# Patient Record
Sex: MALE | Race: WHITE | NOT HISPANIC OR LATINO | Employment: FULL TIME | URBAN - METROPOLITAN AREA
[De-identification: names, ages, dates, MRNs, and addresses within clinical notes are randomized per-mention and may not be internally consistent; named-entity substitution may affect disease eponyms.]

---

## 2018-08-09 ENCOUNTER — OFFICE VISIT (OUTPATIENT)
Dept: FAMILY MEDICINE CLINIC | Facility: CLINIC | Age: 43
End: 2018-08-09

## 2018-08-09 VITALS
TEMPERATURE: 98.4 F | OXYGEN SATURATION: 98 % | DIASTOLIC BLOOD PRESSURE: 76 MMHG | HEIGHT: 69 IN | BODY MASS INDEX: 36.73 KG/M2 | WEIGHT: 248 LBS | SYSTOLIC BLOOD PRESSURE: 118 MMHG | HEART RATE: 77 BPM

## 2018-08-09 DIAGNOSIS — Z13.6 SCREENING FOR ISCHEMIC HEART DISEASE: ICD-10-CM

## 2018-08-09 DIAGNOSIS — K21.9 GASTROESOPHAGEAL REFLUX DISEASE WITHOUT ESOPHAGITIS: ICD-10-CM

## 2018-08-09 DIAGNOSIS — J45.20 MILD INTERMITTENT ASTHMA WITHOUT COMPLICATION: ICD-10-CM

## 2018-08-09 DIAGNOSIS — Z13.1 SCREENING FOR DIABETES MELLITUS: ICD-10-CM

## 2018-08-09 DIAGNOSIS — Z12.5 SPECIAL SCREENING FOR MALIGNANT NEOPLASM OF PROSTATE: ICD-10-CM

## 2018-08-09 DIAGNOSIS — R06.83 SNORING: ICD-10-CM

## 2018-08-09 DIAGNOSIS — Z00.00 ROUTINE GENERAL MEDICAL EXAMINATION AT A HEALTH CARE FACILITY: Primary | ICD-10-CM

## 2018-08-09 DIAGNOSIS — M54.50 CHRONIC MIDLINE LOW BACK PAIN WITHOUT SCIATICA: ICD-10-CM

## 2018-08-09 DIAGNOSIS — R53.83 FATIGUE, UNSPECIFIED TYPE: ICD-10-CM

## 2018-08-09 DIAGNOSIS — G89.29 CHRONIC MIDLINE LOW BACK PAIN WITHOUT SCIATICA: ICD-10-CM

## 2018-08-09 LAB
ALBUMIN SERPL-MCNC: 5 G/DL (ref 3.5–5.2)
ALBUMIN/GLOB SERPL: 1.9 G/DL
ALP SERPL-CCNC: 81 U/L (ref 39–117)
ALT SERPL-CCNC: 32 U/L (ref 1–41)
AST SERPL-CCNC: 12 U/L (ref 1–40)
BILIRUB SERPL-MCNC: 0.3 MG/DL (ref 0.1–1.2)
BUN SERPL-MCNC: 11 MG/DL (ref 6–20)
BUN/CREAT SERPL: 12.6 (ref 7–25)
CALCIUM SERPL-MCNC: 10 MG/DL (ref 8.6–10.5)
CHLORIDE SERPL-SCNC: 100 MMOL/L (ref 98–107)
CHOLEST SERPL-MCNC: 158 MG/DL (ref 0–200)
CO2 SERPL-SCNC: 27.8 MMOL/L (ref 22–29)
CREAT SERPL-MCNC: 0.87 MG/DL (ref 0.76–1.27)
GLOBULIN SER CALC-MCNC: 2.6 GM/DL
GLUCOSE SERPL-MCNC: 115 MG/DL (ref 65–99)
HDLC SERPL-MCNC: 45 MG/DL (ref 40–60)
LDLC SERPL CALC-MCNC: 65 MG/DL (ref 0–100)
LDLC/HDLC SERPL: 1.45 {RATIO}
POTASSIUM SERPL-SCNC: 4.6 MMOL/L (ref 3.5–5.2)
PROT SERPL-MCNC: 7.6 G/DL (ref 6–8.5)
PSA SERPL-MCNC: 1.22 NG/ML (ref 0–4)
SODIUM SERPL-SCNC: 138 MMOL/L (ref 136–145)
TRIGL SERPL-MCNC: 238 MG/DL (ref 0–150)
VLDLC SERPL CALC-MCNC: 47.6 MG/DL (ref 5–40)

## 2018-08-09 PROCEDURE — 99213 OFFICE O/P EST LOW 20 MIN: CPT | Performed by: NURSE PRACTITIONER

## 2018-08-09 PROCEDURE — 99386 PREV VISIT NEW AGE 40-64: CPT | Performed by: NURSE PRACTITIONER

## 2018-08-09 RX ORDER — ALBUTEROL SULFATE 90 UG/1
AEROSOL, METERED RESPIRATORY (INHALATION)
COMMUNITY
Start: 2018-05-03

## 2018-08-09 RX ORDER — OMEPRAZOLE 20 MG/1
20 CAPSULE, DELAYED RELEASE ORAL DAILY
Qty: 90 CAPSULE | Refills: 3 | Status: SHIPPED | OUTPATIENT
Start: 2018-08-09 | End: 2021-09-20

## 2018-08-09 RX ORDER — CYCLOBENZAPRINE HCL 10 MG
10 TABLET ORAL
COMMUNITY
Start: 2018-04-23 | End: 2019-04-23

## 2018-08-09 RX ORDER — BUDESONIDE AND FORMOTEROL FUMARATE DIHYDRATE 160; 4.5 UG/1; UG/1
AEROSOL RESPIRATORY (INHALATION)
COMMUNITY
Start: 2018-05-03

## 2018-08-09 RX ORDER — MONTELUKAST SODIUM 10 MG/1
TABLET ORAL
COMMUNITY
Start: 2018-05-03

## 2018-08-09 RX ORDER — IBUPROFEN 200 MG
200 TABLET ORAL EVERY 6 HOURS PRN
COMMUNITY

## 2018-08-09 NOTE — PROGRESS NOTES
"Subjective   Austen Loco is a 43 y.o. male.     History of Present Illness   Austen Loco 43 y.o. male who presents today for a new patient appointment.    he has a history of   Patient Active Problem List   Diagnosis   • Routine general medical examination at a health care facility   • Special screening for malignant neoplasm of prostate   • Snoring   • Fatigue   • Mild intermittent asthma without complication   • Chronic midline low back pain without sciatica   • Gastroesophageal reflux disease without esophagitis   .  he is here to establish care I reviewed the PFSH recorded today by my MA/LPN staff.   he   He has been feeling well.    Patient has asthma and is currently seeing a specialist for this.    Patient uses Flexeril on a when necessary basis due to muscle aches and back pain because of his very labored job.  He does not need any minute medication refills at this time.    Patient also been experiencing some belching and burping abdominal bloating and heartburn he's not use any medication over-the-counter and is requesting a GI referral.    Patient also complains of snoring and his fiancée is in the office visit today with him and states that there are times that night where she has to poke him because he stops breathing.    The following portions of the patient's history were reviewed and updated as appropriate: allergies, current medications, past social history and problem list.    Review of Systems   Constitutional: Negative for fever.   All other systems reviewed and are negative.      Objective   /76 (BP Location: Right arm, Patient Position: Sitting)   Pulse 77   Temp 98.4 °F (36.9 °C)   Ht 175.2 cm (68.98\")   Wt 112 kg (248 lb)   SpO2 98%   BMI 36.65 kg/m²   Physical Exam   Constitutional: He is oriented to person, place, and time. He appears well-developed and well-nourished. No distress.   HENT:   Head: Normocephalic and atraumatic. Hair is normal.   Right Ear: " Hearing, tympanic membrane, external ear and ear canal normal.   Left Ear: Hearing, tympanic membrane, external ear and ear canal normal.   Nose: No sinus tenderness or nasal deformity.   Mouth/Throat: Uvula is midline, oropharynx is clear and moist and mucous membranes are normal. No oral lesions. No uvula swelling.   Eyes: Pupils are equal, round, and reactive to light. Conjunctivae, EOM and lids are normal. Right eye exhibits no discharge. Left eye exhibits no discharge. No scleral icterus. Right eye exhibits normal extraocular motion and no nystagmus. Left eye exhibits normal extraocular motion and no nystagmus.   Neck: Normal range of motion. Neck supple. No JVD present. No thyromegaly present.   Cardiovascular: Normal rate, regular rhythm, normal heart sounds, intact distal pulses and normal pulses.  Exam reveals no gallop.    No murmur heard.  Pulmonary/Chest: Effort normal and breath sounds normal. No respiratory distress. He has no wheezes. He has no rales. He exhibits no tenderness.   Abdominal: Soft. Bowel sounds are normal. He exhibits no distension and no mass. There is no tenderness. There is no guarding. No hernia.   Musculoskeletal: Normal range of motion. He exhibits no edema, tenderness or deformity.   Lymphadenopathy:     He has no cervical adenopathy.   Neurological: He is alert and oriented to person, place, and time. He has normal reflexes. He displays normal reflexes. No cranial nerve deficit. He exhibits normal muscle tone. Coordination normal.   Skin: Skin is warm and dry. No rash noted. He is not diaphoretic.   Psychiatric: He has a normal mood and affect. His behavior is normal. Judgment and thought content normal.   Vitals reviewed.      Assessment/Plan   Problem List Items Addressed This Visit        Respiratory    Snoring    Relevant Orders    Ambulatory Referral to Sleep Medicine    Mild intermittent asthma without complication    Relevant Medications    VENTOLIN  (90 Base)  MCG/ACT inhaler    SYMBICORT 160-4.5 MCG/ACT inhaler    montelukast (SINGULAIR) 10 MG tablet       Digestive    Gastroesophageal reflux disease without esophagitis    Relevant Medications    omeprazole (PRILOSEC) 20 MG capsule    Other Relevant Orders    Ambulatory Referral to Gastroenterology       Nervous and Auditory    Chronic midline low back pain without sciatica       Other    Routine general medical examination at a health care facility - Primary    Special screening for malignant neoplasm of prostate    Relevant Orders    PSA Screen    Fatigue    Relevant Orders    Ambulatory Referral to Sleep Medicine        Follow up after labs  Dicussed weight, diet and exercise    start Prilosec 20 mg a day follow up with GI

## 2018-09-11 ENCOUNTER — OFFICE VISIT (OUTPATIENT)
Dept: GASTROENTEROLOGY | Facility: CLINIC | Age: 43
End: 2018-09-11

## 2018-09-11 VITALS
TEMPERATURE: 98.3 F | HEIGHT: 71 IN | DIASTOLIC BLOOD PRESSURE: 76 MMHG | SYSTOLIC BLOOD PRESSURE: 128 MMHG | WEIGHT: 249.6 LBS | BODY MASS INDEX: 34.94 KG/M2

## 2018-09-11 DIAGNOSIS — R14.2 FLATULENCE, ERUCTATION AND GAS PAIN: ICD-10-CM

## 2018-09-11 DIAGNOSIS — R14.1 FLATULENCE, ERUCTATION AND GAS PAIN: ICD-10-CM

## 2018-09-11 DIAGNOSIS — R10.13 DYSPEPSIA: Primary | ICD-10-CM

## 2018-09-11 DIAGNOSIS — R14.3 FLATULENCE, ERUCTATION AND GAS PAIN: ICD-10-CM

## 2018-09-11 PROCEDURE — 99203 OFFICE O/P NEW LOW 30 MIN: CPT | Performed by: INTERNAL MEDICINE

## 2018-09-11 RX ORDER — NITAZOXANIDE 500 MG/1
500 TABLET ORAL 2 TIMES DAILY WITH MEALS
Qty: 6 TABLET | Refills: 0 | Status: SHIPPED | OUTPATIENT
Start: 2018-09-11 | End: 2021-09-20

## 2018-09-11 NOTE — PROGRESS NOTES
Chief Complaint   Patient presents with   • GI Problem     belching    • Gas     Austen Loco is a 43 y.o. male who presents with a history of flatulence and belching   HPI     Patient 43-year-old male with history of seasonal allergies and asthma presents for evaluation.  Patient actually denies any complaints but girlfriend reports she bears child as well as his family notes chronic issues of belching and flatulence.  Patient funmilayo Hull has had lifelong issues with gas per his mother.  Patient denies fever chills no melena or bright red blood per rectum.  Patient denies having taken anything for his issues in the past.  Patient's girlfriend reports trying to give him some probiotics but so far he does not take it with any consistency.  Patient here for further recommendations.    Past Medical History:   Diagnosis Date   • Allergic    • Asthma        Current Outpatient Prescriptions:   •  ibuprofen (ADVIL,MOTRIN) 200 MG tablet, Take 200 mg by mouth Every 6 (Six) Hours As Needed for Mild Pain ., Disp: , Rfl:   •  Loratadine (CLARITIN PO), Take  by mouth., Disp: , Rfl:   •  montelukast (SINGULAIR) 10 MG tablet, , Disp: , Rfl:   •  omeprazole (PRILOSEC) 20 MG capsule, Take 1 capsule by mouth Daily., Disp: 90 capsule, Rfl: 3  •  SYMBICORT 160-4.5 MCG/ACT inhaler, , Disp: , Rfl:   •  VENTOLIN  (90 Base) MCG/ACT inhaler, , Disp: , Rfl:   •  cyclobenzaprine (FLEXERIL) 10 MG tablet, Take 10 mg by mouth., Disp: , Rfl:   •  nitazoxanide (ALINIA) 500 MG tablet, Take 1 tablet by mouth 2 (Two) Times a Day With Meals., Disp: 6 tablet, Rfl: 0  No Known Allergies  Social History     Social History   • Marital status: Single     Spouse name: N/A   • Number of children: N/A   • Years of education: N/A     Occupational History   • Not on file.     Social History Main Topics   • Smoking status: Never Smoker   • Smokeless tobacco: Current User     Types: Chew   • Alcohol use Yes   • Drug use: No   • Sexual activity: Not  on file     Other Topics Concern   • Not on file     Social History Narrative   • No narrative on file     Family History   Problem Relation Age of Onset   • Sleep apnea Mother      Review of Systems   Constitutional: Negative.    HENT: Negative.    Eyes: Negative.    Respiratory: Negative.    Cardiovascular: Negative.    Gastrointestinal: Negative.    Musculoskeletal: Negative.    Allergic/Immunologic: Negative.    Hematological: Negative.      Vitals:    09/11/18 1000   BP: 128/76   Temp: 98.3 °F (36.8 °C)     Physical Exam   Constitutional: He is oriented to person, place, and time. He appears well-developed and well-nourished.   HENT:   Head: Normocephalic and atraumatic.   Eyes: Pupils are equal, round, and reactive to light. EOM are normal. No scleral icterus.   Neck: Normal range of motion. No tracheal deviation present.   Cardiovascular: Normal rate and regular rhythm.  Exam reveals no gallop and no friction rub.    No murmur heard.  Pulmonary/Chest: Effort normal and breath sounds normal. No respiratory distress. He has no wheezes. He has no rales.   Abdominal: Soft. Bowel sounds are normal. He exhibits no distension and no mass. There is no tenderness. There is no rebound and no guarding.   Musculoskeletal: Normal range of motion. He exhibits no edema.   Neurological: He is alert and oriented to person, place, and time. No cranial nerve deficit.   Skin: Skin is warm and dry. No rash noted.   Psychiatric: He has a normal mood and affect. His behavior is normal.   Nursing note and vitals reviewed.    Diagnoses and all orders for this visit:    Dyspepsia  -     Case Request; Standing  -     Case Request    Flatulence, eructation and gas pain  -     Case Request; Standing  -     Case Request    Other orders  -     nitazoxanide (ALINIA) 500 MG tablet; Take 1 tablet by mouth 2 (Two) Times a Day With Meals.  -     Follow Anesthesia Guidelines / Standing Orders; Future  -     Implement Anesthesia orders day of  procedure.; Standing  -     Obtain informed consent; Standing    Patient 43-year-old male reports no significant complaints other than what his girlfriend is complaining of.  Patient with occasional belching and occasional flatulence that patient reports is no problem but family apparently complaints.  Patient's girlfriend reports flatulence is been an issue since he was a child per his mother.  At this point in view of the upper GI symptoms and history of NSAID use due to chronic back pain would recommend proceeding with EGD.  With history of flatulence patient is trying to start taking a probiotic probe would recommend short course of Alinia to help reduce overall bacterial overgrowth.  We'll follow-up clinically based on findings at endoscopy and response to therapy.

## 2018-10-02 ENCOUNTER — APPOINTMENT (OUTPATIENT)
Dept: SLEEP MEDICINE | Facility: HOSPITAL | Age: 43
End: 2018-10-02
Attending: INTERNAL MEDICINE

## 2019-01-08 RX ORDER — METHYLPREDNISOLONE 4 MG/1
TABLET ORAL
Qty: 21 TABLET | Refills: 0 | Status: SHIPPED | OUTPATIENT
Start: 2019-01-08 | End: 2019-12-12

## 2019-11-21 ENCOUNTER — OFFICE VISIT (OUTPATIENT)
Dept: ORTHOPEDIC SURGERY | Facility: CLINIC | Age: 44
End: 2019-11-21

## 2019-11-21 VITALS — TEMPERATURE: 98.4 F | HEIGHT: 71 IN | WEIGHT: 253 LBS | BODY MASS INDEX: 35.42 KG/M2

## 2019-11-21 DIAGNOSIS — G89.29 CHRONIC PAIN OF RIGHT ANKLE: Primary | ICD-10-CM

## 2019-11-21 DIAGNOSIS — S93.491D SPRAIN OF ANTERIOR TALOFIBULAR LIGAMENT OF RIGHT ANKLE, SUBSEQUENT ENCOUNTER: ICD-10-CM

## 2019-11-21 DIAGNOSIS — M25.571 CHRONIC PAIN OF RIGHT ANKLE: Primary | ICD-10-CM

## 2019-11-21 PROCEDURE — 99213 OFFICE O/P EST LOW 20 MIN: CPT | Performed by: NURSE PRACTITIONER

## 2019-11-21 PROCEDURE — 73610 X-RAY EXAM OF ANKLE: CPT | Performed by: NURSE PRACTITIONER
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
## 2019-11-21 NOTE — PATIENT INSTRUCTIONS
Ankle Pain  The ankle joint holds your body weight and allows you to move around. Ankle pain can occur on either side or the back of one ankle or both ankles. Ankle pain may be sharp and burning or dull and aching. There may be tenderness, stiffness, redness, or warmth around the ankle. Many things can cause ankle pain, including an injury to the area and overuse of the ankle.  Follow these instructions at home:  Activity  · Rest your ankle as told by your health care provider. Avoid any activities that cause ankle pain.  · Do not use the injured limb to support your body weight until your health care provider says that you can. Use crutches as told by your health care provider.  · Do exercises as told by your health care provider.  · Ask your health care provider when it is safe to drive if you have a brace on your ankle.  If you have a brace:  · Wear the brace as told by your health care provider. Remove it only as told by your health care provider.  · Loosen the brace if your toes tingle, become numb, or turn cold and blue.  · Keep the brace clean.  · If the brace is not waterproof:  ? Do not let it get wet.  ? Cover it with a watertight covering when you take a bath or shower.  If you were given an elastic bandage:    · Remove it when you take a bath or a shower.  · Try not to move your ankle very much, but wiggle your toes from time to time. This helps to prevent swelling.  · Adjust the bandage to make it more comfortable if it feels too tight.  · Loosen the bandage if you have numbness or tingling in your foot or if your foot turns cold and blue.  Managing pain, stiffness, and swelling    · If directed, put ice on the painful area.  ? If you have a removable brace or elastic bandage, remove it as told by your health care provider.  ? Put ice in a plastic bag.  ? Place a towel between your skin and the bag.  ? Leave the ice on for 20 minutes, 2-3 times a day.  · Move your toes often to avoid stiffness and to  lessen swelling.  · Raise (elevate) your ankle above the level of your heart while you are sitting or lying down.  General instructions  · Record information about your pain. Writing down the following may be helpful for you and your health care provider:  ? How often you have ankle pain.  ? Where the pain is located.  ? What the pain feels like.  · If treatment involves wearing a prescribed shoe or insole, make sure you wear it correctly and for as long as told by your health care provider.  · Take over-the-counter and prescription medicines only as told by your health care provider.  · Keep all follow-up visits as told by your health care provider. This is important.  Contact a health care provider if:  · Your pain gets worse.  · Your pain is not relieved with medicines.  · You have a fever or chills.  · You are having more trouble with walking.  · You have new symptoms.  Get help right away if:  · Your foot, leg, toes, or ankle:  ? Tingles or becomes numb.  ? Becomes swollen.  ? Turns pale or blue.  Summary  · Ankle pain can occur on either side or the back of one ankle or both ankles.  · Ankle pain may be sharp and burning or dull and aching.  · Rest your ankle as told by your health care provider. If told, apply ice to the area.  · Take over-the-counter and prescription medicines only as told by your health care provider.  This information is not intended to replace advice given to you by your health care provider. Make sure you discuss any questions you have with your health care provider.  Document Released: 06/07/2011 Document Revised: 06/26/2019 Document Reviewed: 06/26/2019  [employer] Interactive Patient Education © 2019 [employer]    RICE Therapy for Routine Care of Injuries  The routine care of many injuries includes rest, ice, compression, and elevation (RICE therapy). RICE therapy is often recommended for injuries to soft tissues, such as muscle strain, sprains, bruises, and overuse injuries. It can  also be used for some bone injuries. Using RICE therapy can help to relieve pain and lessen swelling.  Supplies needed:  · Ice.  · Plastic bag.  · Towel.  · Elastic bandage.  · Pillow or pillows to raise (elevate) the injured body part.  How to care for your injury with RICE therapy    Rest  Rest your injury. This may help with the healing process. Rest usually involves limiting your normal activities and not using the injured part of your body. Generally, you can return to your normal activities when your health care provider says it is okay and you can do them without much discomfort.  If you rest the injury too much, it may not heal as well. Some injuries heal better with early movement instead of resting for too long. Talk with your health care provider about how you should limit your activities and whether you should start range-of-motion exercises for your injury.  Ice  Ice your injury to lessen swelling and pain. Do not apply ice directly to your skin.  · Put ice in a plastic bag.  · Place a towel between your skin and the bag.  · Leave the ice on for 20 minutes, 2-3 times a day. Use ice on as many days as told by your health care provider.  Compression  Put pressure (compression) on your injured area to control swelling, give support, and help with discomfort. Compression may be done with an elastic bandage. If an elastic bandage has been applied, follow these general tips:  · Use the bandage as directed by the maker of the bandage that you are using.  · Do not wrap the bandage too tightly. That may block (cut off) circulation in the arm or leg in the area below the bandage.  ? If part of your body beyond the bandage becomes blue, numb, cold, swollen, or more painful, your bandage is probably too tight. If this occurs, remove your bandage and reapply it more loosely.  · Remove and reapply the bandage every 3-4 hours or as told by your health care provider.  · See your health care provider if the bandage  seems to be making your problems worse rather than better.  Elevation  Elevate your injured area to lessen swelling and pain. If possible, elevate your injured area at or above the level of your heart or the center of your chest.  Contact a health care provider if:  · Your pain and swelling continue.  · Your symptoms are getting worse rather than improving.  Having these problems may mean that you need further evaluation or imaging tests, such as X-rays or an MRI. Sometimes, X-rays may not show a small broken bone (fracture) until days after the injury happened. Make a follow-up appointment with your health care provider. Ask your health care provider, or the department that is doing the imaging test, when your results will be ready.  Get help right away if:  · You have sudden severe pain at or below the area of your injury.  · You have redness or increased swelling around your injury.  · You have tingling or numbness at or below the area of your injury and it does not improve after you remove the elastic bandage.  Summary  · The routine care of many injuries includes rest, ice, compression, and elevation (RICE therapy). Using RICE therapy can help to relieve pain and lessen swelling.  · RICE therapy is often recommended for injuries to soft tissues, such as muscle strain, sprains, bruises, and overuse injuries. It can also be used for some bone injuries.  · Seek medical care if your pain and swelling continue or if your symptoms are getting worse rather than improving.  This information is not intended to replace advice given to you by your health care provider. Make sure you discuss any questions you have with your health care provider.  Document Released: 04/01/2002 Document Revised: 09/07/2018 Document Reviewed: 09/07/2018  [employer] Interactive Patient Education © 2019 [employer]

## 2019-12-03 ENCOUNTER — APPOINTMENT (OUTPATIENT)
Dept: MRI IMAGING | Facility: HOSPITAL | Age: 44
End: 2019-12-03

## 2019-12-12 ENCOUNTER — OFFICE VISIT (OUTPATIENT)
Dept: SPORTS MEDICINE | Facility: CLINIC | Age: 44
End: 2019-12-12

## 2019-12-12 VITALS
BODY MASS INDEX: 35.42 KG/M2 | SYSTOLIC BLOOD PRESSURE: 116 MMHG | WEIGHT: 253 LBS | DIASTOLIC BLOOD PRESSURE: 70 MMHG | HEIGHT: 71 IN | HEART RATE: 90 BPM

## 2019-12-12 DIAGNOSIS — S99.911D RIGHT ANKLE INJURY, SUBSEQUENT ENCOUNTER: ICD-10-CM

## 2019-12-12 DIAGNOSIS — S80.11XA CONTUSION OF RIGHT TIBIA: Primary | ICD-10-CM

## 2019-12-12 PROCEDURE — 99214 OFFICE O/P EST MOD 30 MIN: CPT | Performed by: FAMILY MEDICINE

## 2019-12-12 NOTE — PROGRESS NOTES
"Austen is a 44 y.o. year old male evaluation of a problem that is new to this examiner.    Chief Complaint   Patient presents with   • RT foot pain     x 1 month       History of Present Illness  HPI   Here to follow-up on right ankle injury.  He twisted and fell about a month ago, was initially seen in immediate care then followed up with REKHA Bailey at [facility]opedic access.  He subsequently had an MRI last week that showed a subchondral bone bruise in the lateral posterior tibial plafond, otherwise normal.  He felt well in a fracture boot, tried to return to normal boots and a brace but he has pain with weightbearing, aching/throbbing, mild to moderate severity.  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    I have reviewed the patient's medical, family, and social history in detail and updated the computerized patient record.    Review of Systems   Constitutional: Negative for fever.   Musculoskeletal:        Per HPI   Skin: Negative for wound.   Neurological: Negative for numbness.   All other systems reviewed and are negative.      /70   Pulse 90   Ht 180.3 cm (70.98\")   Wt 115 kg (253 lb)   BMI 35.30 kg/m²      Physical Exam    Vital signs reviewed.   General: No acute distress.  Eyes: conjunctiva clear; pupils equally round and reactive  ENT: external ears and nose atraumatic; oropharynx clear  CV: no peripheral edema, 2+ distal pulses  Resp: normal respiratory effort, no use of accessory muscles  Skin: no rashes or wounds; normal turgor  Psych: mood and affect appropriate; recent and remote memory intact  Neuro: sensation to light touch intact    MSK Exam:  Ortho Exam  Right ankle: Normal appearance except for mild soft tissue swelling.  There is mild tenderness palpation of the lateral aspect of the joint, there is more tenderness in the deep posterior aspect of the joint.  Normal range of motion.  No ligamentous laxity.  Normal strength.    I reviewed his MRI which shows subchondral " marrow edema on the posterior lateral tibial plafond and a small effusion.      Diagnoses and all orders for this visit:    Contusion of right tibia    Right ankle injury, subsequent encounter    Overall I think he needs to give this bone bruise little more time to heal based on the demands of his job.  Recommend he resume use of a fracture boot while he is weightbearing, perform basic physical therapy exercises, and follow-up in 3 weeks to evaluate for return to work, which will be just shy of 8 weeks total treatment.  He may need formal work hardening therapy.      EMR Dragon/Transcription disclaimer:    Much of this encounter note is an electronic transcription/translation of spoken language to printed text.  The electronic translation of spoken language may permit erroneous, or at times, nonsensical words or phrases to be inadvertently transcribed.  Although I have reviewed the note for such errors some may still exist.

## 2020-01-03 ENCOUNTER — OFFICE VISIT (OUTPATIENT)
Dept: SPORTS MEDICINE | Facility: CLINIC | Age: 45
End: 2020-01-03

## 2020-01-03 VITALS
HEIGHT: 71 IN | OXYGEN SATURATION: 97 % | SYSTOLIC BLOOD PRESSURE: 136 MMHG | DIASTOLIC BLOOD PRESSURE: 84 MMHG | HEART RATE: 94 BPM | WEIGHT: 253 LBS | BODY MASS INDEX: 35.42 KG/M2

## 2020-01-03 DIAGNOSIS — S80.11XA CONTUSION OF RIGHT TIBIA: ICD-10-CM

## 2020-01-03 DIAGNOSIS — S99.911D RIGHT ANKLE INJURY, SUBSEQUENT ENCOUNTER: Primary | ICD-10-CM

## 2020-01-03 PROCEDURE — 99213 OFFICE O/P EST LOW 20 MIN: CPT | Performed by: FAMILY MEDICINE

## 2020-01-03 PROCEDURE — 73610 X-RAY EXAM OF ANKLE: CPT | Performed by: FAMILY MEDICINE

## 2020-01-03 NOTE — PROGRESS NOTES
"Austen is a 44 y.o. year old male follow up on a problem familiar to this examiner.     Chief Complaint   Patient presents with   • F/U RT foot pain       History of Present Illness  HPI   Here to follow-up on right foot pain -ankle sprain with subchondral tibial plafond marrow edema/microfracture.  Overall he is slowly improving, but still has pain with weightbearing after more than several minutes wearing the fracture boot.  Still does not tolerate full weightbearing.  Pain remains relatively mild, aching with current treatment.    I have reviewed the patient's medical, family, and social history in detail and updated the computerized patient record.    Review of Systems   Constitutional: Negative.    Neurological: Negative.        /84   Pulse 94   Ht 180.3 cm (70.98\")   Wt 115 kg (253 lb)   SpO2 97%   BMI 35.30 kg/m²      Physical Exam    Vital signs reviewed.   General: No acute distress.  Eyes: conjunctiva clear; pupils equally round and reactive  ENT: external ears and nose atraumatic; oropharynx clear  CV: no peripheral edema, 2+ distal pulses  Resp: normal respiratory effort, no use of accessory muscles  Skin: no rashes or wounds; normal turgor  Psych: mood and affect appropriate; recent and remote memory intact  Neuro: sensation to light touch intact    MSK Exam:  Ortho Exam  Right ankle: Normal appearance except for mild generalized soft tissue swelling.  There is tenderness palpation primarily in the lateral aspect of the ankle.  Normal range of motion.  No ligamentous laxity.  Normal strength.    Right Ankle X-Ray  Indication: Follow-up  Views: AP, Lateral, Mortise    Findings:  No fracture  No bony lesion  Soft tissues normal  Normal joint spaces    No prior studies available for comparison.       Diagnoses and all orders for this visit:    Right ankle injury, subsequent encounter  -     XR Ankle 3+ View Right  -     Ambulatory Referral to Physical Therapy Evaluate and treat    Contusion of " right tibia  -     Ambulatory Referral to Physical Therapy Evaluate and treat    Will continue conservative treatment for now.  I do not see how he could return to work yet at this time.  We will continue to hold him from work.  Continue the fracture boot, okay to bear weight wearing a stabilizing brace if tolerated.  Start physical therapy.  Recheck in 4 weeks.      EMR Dragon/Transcription disclaimer:    Much of this encounter note is an electronic transcription/translation of spoken language to printed text.  The electronic translation of spoken language may permit erroneous, or at times, nonsensical words or phrases to be inadvertently transcribed.  Although I have reviewed the note for such errors some may still exist.

## 2020-01-21 ENCOUNTER — TREATMENT (OUTPATIENT)
Dept: PHYSICAL THERAPY | Facility: CLINIC | Age: 45
End: 2020-01-21

## 2020-01-21 DIAGNOSIS — S99.911D RIGHT ANKLE INJURY, SUBSEQUENT ENCOUNTER: Primary | ICD-10-CM

## 2020-01-21 DIAGNOSIS — S80.11XA CONTUSION OF RIGHT TIBIA: ICD-10-CM

## 2020-01-21 PROCEDURE — 97110 THERAPEUTIC EXERCISES: CPT | Performed by: PHYSICAL THERAPIST

## 2020-01-21 PROCEDURE — 97161 PT EVAL LOW COMPLEX 20 MIN: CPT | Performed by: PHYSICAL THERAPIST

## 2020-01-21 PROCEDURE — 97014 ELECTRIC STIMULATION THERAPY: CPT | Performed by: PHYSICAL THERAPIST

## 2020-01-21 PROCEDURE — 97140 MANUAL THERAPY 1/> REGIONS: CPT | Performed by: PHYSICAL THERAPIST

## 2020-01-21 NOTE — PROGRESS NOTES
Physical Therapy Initial Evaluation and Plan of Care    Patient: Austen Loco   : 1975  Diagnosis/ICD-10 Code:  Right ankle injury, subsequent encounter [S99.911D]  Referring practitioner: Gerald Christie MD  Date of Initial Visit: 2020  Today's Date: 2020  Patient seen for 1 sessions           Subjective Questionnaire: LEFS:       Subjective Evaluation    History of Present Illness  Date of onset: 2019  Mechanism of injury: Pt injured the ankle at work in November right foot pain -ankle sprain with subchondral tibial plafond marrow edema/microfracture.  He has a knee walker for a month after the injury but now uses it again based on MD recommendations.  He has a boot but doesn't wear it.  He said its slowly improving.      Subjective comment: R ankle  Patient Occupation: construction Quality of life: good    Pain  Current pain ratin  At best pain ratin  At worst pain rating: 10  Location: R ankle  Quality: sharp, pressure and dull ache  Aggravating factors: ambulation, prolonged positioning, standing and stairs  Progression: improved    Diagnostic Tests  X-ray: abnormal  MRI studies: abnormal    Treatments  Previous treatment: immobilization  Current treatment: physical therapy  Patient Goals  Patient goals for therapy: increased strength, decreased pain, increased motion, improved balance, independence with ADLs/IADLs and return to work  Patient goal: back to work           Objective       Palpation     Additional Palpation Details  No TTP in muscles    Tenderness     Right Ankle/Foot   Tenderness in the fibula, lateral malleolus, posterior talofibular ligament and tarsals.     Neurological Testing     Sensation     Ankle/Foot     Right Ankle/Foot   Intact: light touch     Active Range of Motion     Right Ankle/Foot   Dorsiflexion (ke): 90 degrees with pain  Plantar flexion: 43 degrees with pain  Inversion: 15 degrees with pain  Eversion: 3 degrees   Great toe  flexion: WFL    Passive Range of Motion     Right Ankle/Foot    Dorsiflexion (ke): 94 degrees   Plantar flexion: 44 degrees   Inversion: 18 degrees   Eversion: 10 degrees     Joint Play     Right Ankle/Foot  Joints within functional limits are the midfoot and forefoot. Hypomobile in the distal tibiofibular joint, talocrural joint and subtalar joint.     Strength/Myotome Testing     Right Ankle/Foot   Dorsiflexion: 4-  Plantar flexion: 3  Inversion: 3+  Eversion: 3+    Tests     Right Ankle/Foot   Negative for anterior drawer.     Ambulation     Observational Gait     Additional Observational Gait Details  Guarded and antalgic with minimal R wt bearing, R eversion with pes planus in stance, R knee ext in swing    Functional Assessment     Comments  Unable to squat due to pain  Stairs pain with L LE leading on descent so education on safe descent leading with R LE         Assessment & Plan     Assessment  Impairments: abnormal gait, abnormal muscle firing, abnormal or restricted ROM, activity intolerance, impaired balance, impaired physical strength, lacks appropriate home exercise program, pain with function and weight-bearing intolerance  Assessment details: Pt presents with pain the foot and ankle with greatest pain tarsal area/talus and tib fib distal junction.  He has abnormal gait and uses a knee walker at times.  He is supposed to wear a Cam boot per MD orders but did not wear this in today, brought his walker.  He has weakness and ROM loss in the R ankle and since his gait is abnormal, we need to gradually strengthen and improve gait.  Stairs are navigated improperly on descent and he needs education on this. He would benefit from skilled PT to address his deficits and reduce his pain for return to work and normal walking.   Prognosis: good  Functional Limitations: walking, standing and unable to perform repetitive tasks  Goals  Plan Goals: STG(In 4 weeks)  1) Pt is I with HEP with no increase in pain with  progression to full standing dynamic exercise  2) Pt demonstrates improved R ankle ROM by 5 degrees in DF and PF  3) Pt will be able to tolerate and complete single leg stance R LE with no UE support for 10 seconds indicating reduced pain and improved stability.    4) Pt will demonstrate improved R ankle strength to 4/5 in all planes    LTG(In 8 weeks)  1) Pt will score 25% or higher on LEFS indicating improved perceived ability  2) Pt will be able to navigate stairs reciprocally with pain 2/10 or less  3) Pt will report being able to walk 10 min with R ankle brace on on even/uneven surfaces with pain less than 2/10  4) Pt will demonstrate improved strength to 4+/5 in R ankle and able to complete activity such as  SLS with reach and ball toss with no pain or need for UE support      Plan  Therapy options: will be seen for skilled physical therapy services  Planned modality interventions: cryotherapy, TENS, traction, high voltage pulsed current (dermal wound therapy), high voltage pulsed current (pain management), high voltage pulsed current (spasm management), electrical stimulation/Russian stimulation and iontophoresis  Planned therapy interventions: joint mobilization, home exercise program, gait training, functional ROM exercises, flexibility, body mechanics training, balance/weight-bearing training, ADL retraining, manual therapy, neuromuscular re-education, postural training, soft tissue mobilization, strengthening, spinal/joint mobilization, stretching, therapeutic activities and transfer training  Frequency: 2x week  Duration in weeks: 8  Treatment plan discussed with: patient        Manual Therapy:    13     mins  40646;  Therapeutic Exercise:    17     mins  89107;     Neuromuscular Dm:    0    mins  02298;    Therapeutic Activity:     0     mins  47206;     Gait Trainin     mins  66830;     Ultrasound:     0     mins  58509;    Electrical Stimulation:    15     mins  70325 ( );  Dry  Needling     0     mins self-pay    Timed Treatment:   30   mins   Total Treatment:     54   mins    PT SIGNATURE: Ange Singh, PT   DATE TREATMENT INITIATED: 1/21/2020    Initial Certification  Certification Period: 4/20/2020  I certify that the therapy services are furnished while this patient is under my care.  The services outlined above are required by this patient, and will be reviewed every 90 days.     PHYSICIAN: Gerald Christie MD      DATE:     Please sign and return via fax to [phone].. Thank you, [facility].

## 2020-01-23 ENCOUNTER — TREATMENT (OUTPATIENT)
Dept: PHYSICAL THERAPY | Facility: CLINIC | Age: 45
End: 2020-01-23

## 2020-01-23 DIAGNOSIS — S99.911D RIGHT ANKLE INJURY, SUBSEQUENT ENCOUNTER: Primary | ICD-10-CM

## 2020-01-23 PROCEDURE — 97035 APP MDLTY 1+ULTRASOUND EA 15: CPT | Performed by: PHYSICAL THERAPIST

## 2020-01-23 PROCEDURE — 97110 THERAPEUTIC EXERCISES: CPT | Performed by: PHYSICAL THERAPIST

## 2020-01-23 PROCEDURE — 97140 MANUAL THERAPY 1/> REGIONS: CPT | Performed by: PHYSICAL THERAPIST

## 2020-01-23 NOTE — PROGRESS NOTES
Physical Therapy Daily Progress Note      Patient: Austen Loco   : 1975  Referring practitioner: Gerald Christie MD  Date of Initial Visit: No linked episodes  Today's Date: 2020  Patient seen for Visit count could not be calculated. Make sure you are using a visit which is associated with an episode. sessions               Subjective Pt with no new complaints. States he has been walking more but wearing the boot    Objective   See Exercise, Manual, and Modality Logs for complete treatment.       Assessment/Plan  Pt is progressing well and tolerated new standing ex well    Visit Diagnoses:    ICD-10-CM ICD-9-CM   1. Right ankle injury, subsequent encounter S99.911D V58.89     959.7          Timed:  Manual Therapy:    14     mins  89018;  Therapeutic Exercise:    13     mins  22403;     Neuromuscular Dm:    0    mins  85322;    Therapeutic Activity:     0     mins  52090;     Gait Trainin     mins  58955;     Ultrasound:     8     mins  27632;    Electrical Stimulation:    0     mins  87446 ( );    Untimed:  Electrical Stimulation:    0     mins  41142 ( );  Mechanical Traction:    0     mins  49571;     Timed Treatment:   45   mins   Total Treatment:     45   mins  Ange Singh, PT  Physical Therapist

## 2020-01-30 ENCOUNTER — TREATMENT (OUTPATIENT)
Dept: PHYSICAL THERAPY | Facility: CLINIC | Age: 45
End: 2020-01-30

## 2020-01-30 DIAGNOSIS — S99.911D RIGHT ANKLE INJURY, SUBSEQUENT ENCOUNTER: Primary | ICD-10-CM

## 2020-01-30 PROCEDURE — 97112 NEUROMUSCULAR REEDUCATION: CPT | Performed by: PHYSICAL THERAPIST

## 2020-01-30 PROCEDURE — 97530 THERAPEUTIC ACTIVITIES: CPT | Performed by: PHYSICAL THERAPIST

## 2020-01-30 PROCEDURE — 97014 ELECTRIC STIMULATION THERAPY: CPT | Performed by: PHYSICAL THERAPIST

## 2020-01-30 PROCEDURE — 97140 MANUAL THERAPY 1/> REGIONS: CPT | Performed by: PHYSICAL THERAPIST

## 2020-01-30 NOTE — PROGRESS NOTES
Physical Therapy Daily Progress Note      Patient: Austen Loco   : 1975  Referring practitioner: Gerald Christie MD  Date of Initial Visit: Type: THERAPY  Noted: 2020  Today's Date: 2020  Patient seen for 3 sessions               Subjective Pt is having a lot of pain the past 3 days.  He didn't do anything out of the ordinary so not sure why.     Objective   See Exercise, Manual, and Modality Logs for complete treatment.   Severe TTP at distal lateral achilles insertion    Assessment/Plan  Pt is having slow progress so standing dynamic activity has not been added.  He tolerated self mob for DF and step downs on small step to improve DF ROM    Visit Diagnoses:    ICD-10-CM ICD-9-CM   1. Right ankle injury, subsequent encounter S99.911D V58.89     959.7          Timed:  Manual Therapy:    18     mins  34276;  Therapeutic Exercise:     0    mins  59229;     Neuromuscular Dm:    12    mins  91188;    Therapeutic Activity:     14     mins  44611;     Gait Trainin     mins  11731;     Ultrasound:     0     mins  71048;    Electrical Stimulation:    15     mins  22615 ( );    Untimed:  Electrical Stimulation:    15     mins  37335 ( );  Mechanical Traction:    0     mins  33208;     Timed Treatment:   44   mins   Total Treatment:     59   mins  Ange Singh, PT  Physical Therapist

## 2020-01-31 ENCOUNTER — OFFICE VISIT (OUTPATIENT)
Dept: SPORTS MEDICINE | Facility: CLINIC | Age: 45
End: 2020-01-31

## 2020-01-31 VITALS
WEIGHT: 250 LBS | SYSTOLIC BLOOD PRESSURE: 130 MMHG | BODY MASS INDEX: 35 KG/M2 | HEART RATE: 100 BPM | OXYGEN SATURATION: 98 % | HEIGHT: 71 IN | DIASTOLIC BLOOD PRESSURE: 88 MMHG

## 2020-01-31 DIAGNOSIS — S99.911D RIGHT ANKLE INJURY, SUBSEQUENT ENCOUNTER: Primary | ICD-10-CM

## 2020-01-31 DIAGNOSIS — S80.11XA CONTUSION OF RIGHT TIBIA: ICD-10-CM

## 2020-01-31 PROCEDURE — 99213 OFFICE O/P EST LOW 20 MIN: CPT | Performed by: FAMILY MEDICINE

## 2020-01-31 RX ORDER — MELOXICAM 15 MG/1
15 TABLET ORAL DAILY
Qty: 30 TABLET | Refills: 2 | Status: SHIPPED | OUTPATIENT
Start: 2020-01-31 | End: 2020-07-01

## 2020-01-31 NOTE — PROGRESS NOTES
"Asuten is a 44 y.o. year old male follow up on a problem familiar to this examiner.     Chief Complaint   Patient presents with   • Right Ankle - Follow-up   • Ankle Injury     RT ankle pain/injury        History of Present Illness  HPI   Here to follow-up on right ankle injury.  Since his last visit he has been improving slowly with physical therapy.  He is tolerating weightbearing better with use of a high cut boot instead of his fracture boot.  Still has intermittent swelling that is worse with activity.  This is associated with dull/throbbing pain.  Mild with rest, moderately severe with activities.    I have reviewed the patient's medical, family, and social history in detail and updated the computerized patient record.    Review of Systems   Constitutional: Negative.    Musculoskeletal: Positive for arthralgias and joint swelling.       /88 (BP Location: Right arm, Patient Position: Sitting, Cuff Size: Adult)   Pulse 100   Ht 180.3 cm (70.98\")   Wt 113 kg (250 lb)   SpO2 98%   BMI 34.88 kg/m²      Physical Exam    Vital signs reviewed.   General: No acute distress.  Eyes: conjunctiva clear; pupils equally round and reactive  ENT: external ears and nose atraumatic; oropharynx clear  CV: no peripheral edema, 2+ distal pulses  Resp: normal respiratory effort, no use of accessory muscles  Skin: no rashes or wounds; normal turgor  Psych: mood and affect appropriate; recent and remote memory intact  Neuro: sensation to light touch intact    MSK Exam:  Ortho Exam  R ankle: mild-moderate generalized ankle swelling with a small effusion. TTP posterior medial tibial plafond, and the lateral joint line.  Range of motion is mildly restricted in all planes.  No ligamentous laxity.  Strength intact.      Diagnoses and all orders for this visit:    Right ankle injury, subsequent encounter    Contusion of right tibia    Overall I think he is improving gradually. Will address the joint component by adding daily " nsaid, protect stability with transition to ASO brace instead of fracture boot. Continue gradual increase in activity with PT support. Consider joint injection at follow up in 4 weeks.         EMR Dragon/Transcription disclaimer:    Much of this encounter note is an electronic transcription/translation of spoken language to printed text.  The electronic translation of spoken language may permit erroneous, or at times, nonsensical words or phrases to be inadvertently transcribed.  Although I have reviewed the note for such errors some may still exist.

## 2020-02-04 ENCOUNTER — TREATMENT (OUTPATIENT)
Dept: PHYSICAL THERAPY | Facility: CLINIC | Age: 45
End: 2020-02-04

## 2020-02-04 DIAGNOSIS — S80.11XA CONTUSION OF RIGHT TIBIA: ICD-10-CM

## 2020-02-04 DIAGNOSIS — S99.911D RIGHT ANKLE INJURY, SUBSEQUENT ENCOUNTER: Primary | ICD-10-CM

## 2020-02-04 PROCEDURE — 97110 THERAPEUTIC EXERCISES: CPT | Performed by: PHYSICAL THERAPIST

## 2020-02-04 PROCEDURE — 97530 THERAPEUTIC ACTIVITIES: CPT | Performed by: PHYSICAL THERAPIST

## 2020-02-04 PROCEDURE — 97112 NEUROMUSCULAR REEDUCATION: CPT | Performed by: PHYSICAL THERAPIST

## 2020-02-04 NOTE — PROGRESS NOTES
Physical Therapy Daily Progress Note      Patient: Austen Loco   : 1975  Referring practitioner: Gerald Christie MD  Date of Initial Visit: No linked episodes  Today's Date: 2020  Patient seen for Visit count could not be calculated. Make sure you are using a visit which is associated with an episode. sessions               Subjective Pt states he got a brace from the doctor and that has helped    Objective   See Exercise, Manual, and Modality Logs for complete treatment.       Assessment/Plan I thought the pt already had a brace and had been wearing based on our initial conversation day 1. Dry needling next session since pt continues to have pain and edema.    Visit Diagnoses:    ICD-10-CM ICD-9-CM   1. Right ankle injury, subsequent encounter S99.911D V58.89     959.7   2. Contusion of right tibia S80.11XA 924.10                  Timed:  Manual Therapy:    0     mins  54417;  Therapeutic Exercise:    16     mins  11283;     Neuromuscular Dm:    12    mins  41862;    Therapeutic Activity:     18     mins  28310;     Gait Trainin     mins  21021;     Ultrasound:     0     mins  51801;    Electrical Stimulation:    0     mins  42992 ( );    Untimed:  Electrical Stimulation:    0     mins  60070 ( );  Mechanical Traction:    0     mins  31828;     Timed Treatment:   46   mins   Total Treatment:     46   mins  Ange Singh, PT  Physical Therapist

## 2020-02-06 ENCOUNTER — TREATMENT (OUTPATIENT)
Dept: PHYSICAL THERAPY | Facility: CLINIC | Age: 45
End: 2020-02-06

## 2020-02-06 DIAGNOSIS — S99.911D RIGHT ANKLE INJURY, SUBSEQUENT ENCOUNTER: Primary | ICD-10-CM

## 2020-02-06 PROCEDURE — 97035 APP MDLTY 1+ULTRASOUND EA 15: CPT | Performed by: PHYSICAL THERAPIST

## 2020-02-06 PROCEDURE — 97112 NEUROMUSCULAR REEDUCATION: CPT | Performed by: PHYSICAL THERAPIST

## 2020-02-06 PROCEDURE — 97110 THERAPEUTIC EXERCISES: CPT | Performed by: PHYSICAL THERAPIST

## 2020-02-06 NOTE — PROGRESS NOTES
Physical Therapy Daily Progress Note      Patient: Austen Loco   : 1975  Referring practitioner: Gerald Christie MD  Date of Initial Visit: Type: THERAPY  Noted: 2020  Today's Date: 2020  Patient seen for 5 sessions               Subjective Pt states he is hurting a lot today but his back is hurting too so he thinks its because of the rain    Objective   See Exercise, Manual, and Modality Logs for complete treatment.       Assessment/Plan Pt attempts all exercises but has poor tolerance.  We left the brace on for standing ex and he reported no pinching    Visit Diagnoses:    ICD-10-CM ICD-9-CM   1. Right ankle injury, subsequent encounter S99.911D V58.89     959.7                  Timed:  Manual Therapy:    0     mins  95532;  Therapeutic Exercise:    16     mins  88517;     Neuromuscular Dm:    17    mins  01564;    Therapeutic Activity:     0     mins  70060;     Gait Trainin     mins  04700;     Ultrasound:     8     mins  84470;    Electrical Stimulation:    0     mins  14378 ( );    Untimed:  Electrical Stimulation:    0     mins  01379 ( );  Mechanical Traction:    0     mins  48079;     Timed Treatment:  41   mins   Total Treatment:     41   mins  Ange Singh, PT  Physical Therapist

## 2020-02-10 ENCOUNTER — TREATMENT (OUTPATIENT)
Dept: PHYSICAL THERAPY | Facility: CLINIC | Age: 45
End: 2020-02-10

## 2020-02-10 DIAGNOSIS — S99.911D RIGHT ANKLE INJURY, SUBSEQUENT ENCOUNTER: Primary | ICD-10-CM

## 2020-02-10 PROCEDURE — 97110 THERAPEUTIC EXERCISES: CPT | Performed by: PHYSICAL THERAPIST

## 2020-02-10 PROCEDURE — 97530 THERAPEUTIC ACTIVITIES: CPT | Performed by: PHYSICAL THERAPIST

## 2020-02-10 NOTE — PROGRESS NOTES
Physical Therapy Daily Progress Note      Patient: Austen Loco   : 1975  Referring practitioner: Gerald Christie MD  Date of Initial Visit: Type: THERAPY  Noted: 2020  Today's Date: 2/10/2020  Patient seen for 6 sessions            Subjective Pt states he was walking on a slightly tilted area next to a tree in the parking lot and had a sharp pain in the foot    Objective   See Exercise, Manual, and Modality Logs for complete treatment.       Assessment/Plan BOSU added today to further progress lateral ankle stability    Visit Diagnoses:    ICD-10-CM ICD-9-CM   1. Right ankle injury, subsequent encounter S99.911D V58.89     959.7          Timed:  Manual Therapy:    0     mins  81195;  Therapeutic Exercise:    28     mins  97385;     Neuromuscular Dm:    0    mins  59645;    Therapeutic Activity:    13     mins  86419;     Gait Trainin     mins  19166;     Ultrasound:     0     mins  28281;    Electrical Stimulation:    0     mins  68568 ( );    Untimed:  Electrical Stimulation:    0     mins  05665 ( );  Mechanical Traction:    0     mins  03815;     Timed Treatment:   41   mins   Total Treatment:     41   mins  Ange Singh, PT  Physical Therapist

## 2020-02-12 ENCOUNTER — TELEPHONE (OUTPATIENT)
Dept: SPORTS MEDICINE | Facility: CLINIC | Age: 45
End: 2020-02-12

## 2020-02-12 NOTE — TELEPHONE ENCOUNTER
Pt called stating that he needs a letter stating that he can only work so many hrs in a row due to his issue from apt 1/31/2020. He would like you to call if you need detail and discuss if you have the time he states. DENAE

## 2020-02-12 NOTE — TELEPHONE ENCOUNTER
pts wife called and gave us a little more insight about what he needs. He needs a detailed letter explaining his injury to his employer. They are not believing he has a crush injury. They need to have explanation on dx and prognosis. If any questions you may call her, Melody bearden @[phone]. J

## 2020-02-13 ENCOUNTER — TREATMENT (OUTPATIENT)
Dept: PHYSICAL THERAPY | Facility: CLINIC | Age: 45
End: 2020-02-13

## 2020-02-13 DIAGNOSIS — S99.911D RIGHT ANKLE INJURY, SUBSEQUENT ENCOUNTER: Primary | ICD-10-CM

## 2020-02-13 PROCEDURE — 97140 MANUAL THERAPY 1/> REGIONS: CPT | Performed by: PHYSICAL THERAPIST

## 2020-02-13 PROCEDURE — 97110 THERAPEUTIC EXERCISES: CPT | Performed by: PHYSICAL THERAPIST

## 2020-02-13 PROCEDURE — 97112 NEUROMUSCULAR REEDUCATION: CPT | Performed by: PHYSICAL THERAPIST

## 2020-02-13 NOTE — TELEPHONE ENCOUNTER
Placed referral in for the second opinion, I also spoke with patient's wife and relayed the message below. She verbalized understanding and will be in later this afternoon to  the note.

## 2020-02-13 NOTE — TELEPHONE ENCOUNTER
I also got a note from his physical therapist stating that he was having increased pain and not improving.  Since I am out of town next week, I would like to see if we can get him to see a foot and ankle specialist for a second opinion.   Regarding a note to work, we can provide a note that he has an impaction fracture still under medical care.

## 2020-02-13 NOTE — PROGRESS NOTES
Physical Therapy Daily Progress Note      Patient: Austen Loco   : 1975  Referring practitioner: Gerald Christie MD  Date of Initial Visit: No linked episodes  Today's Date: 2020  Patient seen for Visit count could not be calculated. Make sure you are using a visit which is associated with an episode. sessions               Subjective Pt states he is concerned about his ankle after a sharp pain he had when walking on the center grassy area with a tree In the parking lot.  He had bruising and swelling and something doesn't feel right in there.  He feels like he needs an MRI.    Objective   See Exercise, Manual, and Modality Logs for complete treatment.   No bruising present today.    Severe TTP at R ant talofib and calcaneofib ligs    Assessment/Plan Pt was not progressed on ex today due to the pain.      Visit Diagnoses:    ICD-10-CM ICD-9-CM   1. Right ankle injury, subsequent encounter S99.911D V58.89     959.7                  Timed:  Manual Therapy:    13     mins  85573;  Therapeutic Exercise:    16     mins  75452;     Neuromuscular Dm:    13    mins  94401;    Therapeutic Activity:     0     mins  41062;     Gait Trainin     mins  87720;     Ultrasound:     0     mins  22289;    Electrical Stimulation:    0     mins  45116 ( );    Untimed:  Electrical Stimulation:    0     mins  71683 ( );  Mechanical Traction:    0     mins  79041;     Timed Treatment:   42   mins   Total Treatment:     42   mins  Ange Singh, PT  Physical Therapist

## 2020-02-18 ENCOUNTER — TREATMENT (OUTPATIENT)
Dept: PHYSICAL THERAPY | Facility: CLINIC | Age: 45
End: 2020-02-18

## 2020-02-18 DIAGNOSIS — S99.911D RIGHT ANKLE INJURY, SUBSEQUENT ENCOUNTER: Primary | ICD-10-CM

## 2020-02-18 PROCEDURE — 97014 ELECTRIC STIMULATION THERAPY: CPT | Performed by: PHYSICAL THERAPIST

## 2020-02-18 PROCEDURE — 97530 THERAPEUTIC ACTIVITIES: CPT | Performed by: PHYSICAL THERAPIST

## 2020-02-18 PROCEDURE — 97112 NEUROMUSCULAR REEDUCATION: CPT | Performed by: PHYSICAL THERAPIST

## 2020-02-18 PROCEDURE — 97110 THERAPEUTIC EXERCISES: CPT | Performed by: PHYSICAL THERAPIST

## 2020-02-18 NOTE — PROGRESS NOTES
Physical Therapy Daily Progress Note      Patient: Austen Loco   : 1975  Referring practitioner: Gerald Christie MD  Date of Initial Visit: Type: THERAPY  Noted: 2020  Today's Date: 2020  Patient seen for 7 sessions               Subjective Pt states he has actually felt better the past two days and not sure why. He has reduced swelling.    Objective   See Exercise, Manual, and Modality Logs for complete treatment.       Assessment/Plan Pt tolerated the exercises better today than the last couple of sessions.     Visit Diagnoses:    ICD-10-CM ICD-9-CM   1. Right ankle injury, subsequent encounter S99.911D V58.89     959.7          Timed:  Manual Therapy:    0     mins  55090;  Therapeutic Exercise:    18     mins  52707;     Neuromuscular Dm:    12    mins  01039;    Therapeutic Activity:     14     mins  88794;     Gait Trainin     mins  55674;     Ultrasound:     0     mins  74440;    Electrical Stimulation:    15     mins  48429 ( );    Untimed:  Electrical Stimulation:    15     mins  35368 ( );  Mechanical Traction:    0     mins  35312;     Timed Treatment:   44   mins   Total Treatment:     59   mins  Ange Singh, PT  Physical Therapist

## 2020-02-20 ENCOUNTER — TREATMENT (OUTPATIENT)
Dept: PHYSICAL THERAPY | Facility: CLINIC | Age: 45
End: 2020-02-20

## 2020-02-20 DIAGNOSIS — S99.911D RIGHT ANKLE INJURY, SUBSEQUENT ENCOUNTER: Primary | ICD-10-CM

## 2020-02-20 DIAGNOSIS — S80.11XA CONTUSION OF RIGHT TIBIA: ICD-10-CM

## 2020-02-20 PROCEDURE — 97140 MANUAL THERAPY 1/> REGIONS: CPT | Performed by: PHYSICAL THERAPIST

## 2020-02-20 PROCEDURE — 97110 THERAPEUTIC EXERCISES: CPT | Performed by: PHYSICAL THERAPIST

## 2020-02-20 PROCEDURE — 97033 APP MDLTY 1+IONTPHRSIS EA 15: CPT | Performed by: PHYSICAL THERAPIST

## 2020-02-20 PROCEDURE — 97112 NEUROMUSCULAR REEDUCATION: CPT | Performed by: PHYSICAL THERAPIST

## 2020-02-20 NOTE — PROGRESS NOTES
Physical Therapy Daily Progress Note      Patient: Austen Loco   : 1975  Referring practitioner: Gerald Christie MD  Date of Initial Visit: Type: THERAPY  Noted: 2020  Today's Date: 2020  Patient seen for 8 sessions               Subjective Pt continues to feel steadily better and feels the swelling is down    Objective   See Exercise, Manual, and Modality Logs for complete treatment.       Assessment/Plan  Pt is progressing well so mostly standing ex completed today.   Visit Diagnoses:    ICD-10-CM ICD-9-CM   1. Right ankle injury, subsequent encounter S99.911D V58.89     959.7   2. Contusion of right tibia S80.11XA 924.10              Timed:  Manual Therapy:    14     mins  36794;  Therapeutic Exercise:    18     mins  95324;     Neuromuscular Dm:   12  mins  11385;    Therapeutic Activity:     0     mins  10777;     Gait Trainin     mins  75578;     Ultrasound:     0     mins  05898;    Electrical Stimulation:    0     mins  52801 ( );    Untimed:  Electrical Stimulation:    0     mins  23567 ( );  Mechanical Traction:    0     mins  93704;   Ionto   11 mins    Timed Treatment:   44   mins   Total Treatment:     55   mins  Ange Snigh, PT  Physical Therapist

## 2020-02-25 ENCOUNTER — TREATMENT (OUTPATIENT)
Dept: PHYSICAL THERAPY | Facility: CLINIC | Age: 45
End: 2020-02-25

## 2020-02-25 DIAGNOSIS — S99.911D RIGHT ANKLE INJURY, SUBSEQUENT ENCOUNTER: Primary | ICD-10-CM

## 2020-02-25 PROCEDURE — 97110 THERAPEUTIC EXERCISES: CPT | Performed by: PHYSICAL THERAPIST

## 2020-02-25 PROCEDURE — 97112 NEUROMUSCULAR REEDUCATION: CPT | Performed by: PHYSICAL THERAPIST

## 2020-02-25 PROCEDURE — 97530 THERAPEUTIC ACTIVITIES: CPT | Performed by: PHYSICAL THERAPIST

## 2020-02-25 NOTE — PROGRESS NOTES
Re-Assessment / Re-Certification        Patient: Austen Loco   : 1975  Diagnosis/ICD-10 Code:  Right ankle injury, subsequent encounter [S99.911D]  Referring practitioner: Gerald Christie MD  Date of Initial Visit: Type: THERAPY  Noted: 2020  Today's Date: 2020  Patient seen for 9 sessions      Subjective:     Subjective Questionnaire: LEFS: 39/  Clinical Progress: improved  Home Program Compliance: Yes  Treatment has included: therapeutic exercise, neuromuscular re-education, manual therapy, therapeutic activity, gait training, electrical stimulation, ultrasound, iontophoresis and cryotherapy    Subjective  Pt states he has been feeling better.  No pain at rest.  He has max pain of 8/10 when walking on uneven surfaces.  The exercises still cause pain.  The greatest pain is with inversion/eversion. Pain 1/10 on ascent of stairs and 5/10 on descent.     Objective   AROM R ankle  DF 96 degrees   PF 56 degrees  Inv 24 degrees  Ev 18 degrees    SLS 11 sec R LE wit no brace or shoe on    Strength  DF 4+/5  PF 4-/5  Inv 4-/5  Ev 4/5    Assessment/Plan  Pt has improved in all areas since SOC.  He is not using the knee walker any longer.  He is wearing the brace and that helps quite a bit. His gait is still presenting as impaired with early heel off but has improved significantly since SOC.  Emphasis today on gait trng with and without shoes on at slow speed to increase step through and reduce early heel off R. He would benefit from 4 more sessions to reach his goals or reach max potential.    Goals  Plan Goals: STG(In 4 weeks)  1) Pt is I with HEP with no increase in pain with progression to full standing dynamic exercise NOT MET  2) Pt demonstrates improved R ankle ROM by 5 degrees in DF and PF PARTIALLY MET  3) Pt will be able to tolerate and complete single leg stance R LE with no UE support for 10 seconds indicating reduced pain and improved stability  MET  4) Pt will demonstrate improved  R ankle strength to 4/5 in all planes PARTIALLY MET    LTG(In 8 weeks)  1) Pt will score 25% or higher on LEFS indicating improved perceived ability PROGRESSING  2) Pt will be able to navigate stairs reciprocally with pain 2/10 or less PARTIALLY MET  3) Pt will report being able to walk 10 min with R ankle brace on on even/uneven surfaces with pain less than 2/10 NOT MET  4) Pt will demonstrate improved strength to 4+/5 in R ankle and able to complete activity such as  SLS with reach and ball toss with no pain or need for UE support NOT MET  Visit Diagnoses:    ICD-10-CM ICD-9-CM   1. Right ankle injury, subsequent encounter S99.911D V58.89     959.7       Progress toward previous goals: All Met        Recommendations: Continue as planned  Timeframe: 4 more sessions  Prognosis to achieve goals: good    PT Signature: Ange Singh, PT      Based upon review of the patient's progress and continued therapy plan, it is my medical opinion that Austen Loco should continue physical therapy treatment at [facility]  [address] [place] [id number] [phone].    Signature: __________________________________  Gerald Christie MD    Timed:  Manual Therapy:    0     mins  72069;  Therapeutic Exercise:    15     mins  13324;     Neuromuscular Dm:   24    mins  98275;    Therapeutic Activity:     16     mins  00362;     Gait Trainin     mins  32871;     Ultrasound:     0     mins  43360;    Electrical Stimulation:    0     mins  87290 ( );    Untimed:  Electrical Stimulation:    0     mins  16576 ( );  Mechanical Traction:    0     mins  81455;     Timed Treatment:   55   mins   Total Treatment:     55   mins

## 2020-02-27 ENCOUNTER — OFFICE VISIT (OUTPATIENT)
Dept: ORTHOPEDIC SURGERY | Facility: CLINIC | Age: 45
End: 2020-02-27

## 2020-02-27 VITALS — BODY MASS INDEX: 35.84 KG/M2 | TEMPERATURE: 98.1 F | HEIGHT: 71 IN | WEIGHT: 256 LBS

## 2020-02-27 DIAGNOSIS — G57.91 NEURITIS OF RIGHT ANKLE: ICD-10-CM

## 2020-02-27 DIAGNOSIS — M25.571 CHRONIC PAIN OF RIGHT ANKLE: ICD-10-CM

## 2020-02-27 DIAGNOSIS — S82.871A: Primary | ICD-10-CM

## 2020-02-27 DIAGNOSIS — G89.29 CHRONIC PAIN OF RIGHT ANKLE: ICD-10-CM

## 2020-02-27 PROCEDURE — 73610 X-RAY EXAM OF ANKLE: CPT | Performed by: ORTHOPAEDIC SURGERY

## 2020-02-27 PROCEDURE — 99244 OFF/OP CNSLTJ NEW/EST MOD 40: CPT | Performed by: ORTHOPAEDIC SURGERY

## 2020-02-27 RX ORDER — GABAPENTIN 300 MG/1
300 CAPSULE ORAL
Qty: 30 CAPSULE | Refills: 1 | Status: SHIPPED | OUTPATIENT
Start: 2020-02-27 | End: 2020-05-08

## 2020-02-27 NOTE — PROGRESS NOTES
New Patient Complaint      Patient: Austen Loco  YOB: 1975 44 y.o. male  Medical Record Number: [id number]    Chief Complaints: I hurt my ankle    History of Present Illness:   Patient injured his right ankle  he tells me that it happened when a large cable with 30 pounds of pressure went across his chest pushing him backwards twisting his ankle and he is not sure in what mechanism he may have been up and under him he said he felt lots of popping and pulling when this happened.    He has been followed by Dr. Christie for this and had an MRI and was initially treated in a boot and then an ASO and use of hightop boots  with physical therapy and has had some improvements but seems to have plateaued.  He reports moderate to severe aching stabbing pain mainly in the posterior lateral aspect of the right ankle with some feelings of burning and tingling and increased swelling during the day which improved some with elevation overnight.      He is seen today at the request of Dr. Gerald Christie who is requested my opinion regarding etiology and treatment of this condition       HPI    Allergies: No Known Allergies    Medications:   Current Outpatient Medications on File Prior to Visit   Medication Sig   • ibuprofen (ADVIL,MOTRIN) 200 MG tablet Take 200 mg by mouth Every 6 (Six) Hours As Needed for Mild Pain .   • Loratadine (CLARITIN PO) Take  by mouth.   • meloxicam (MOBIC) 15 MG tablet Take 1 tablet by mouth Daily.   • montelukast (SINGULAIR) 10 MG tablet    • nitazoxanide (ALINIA) 500 MG tablet Take 1 tablet by mouth 2 (Two) Times a Day With Meals.   • omeprazole (PRILOSEC) 20 MG capsule Take 1 capsule by mouth Daily.   • SYMBICORT 160-4.5 MCG/ACT inhaler    • VENTOLIN  (90 Base) MCG/ACT inhaler      No current facility-administered medications on file prior to visit.        Past Medical History:   Diagnosis Date   • Allergic    • Asthma      No past surgical history on file.  Social  "History     Occupational History   • Not on file   Tobacco Use   • Smoking status: Never Smoker   • Smokeless tobacco: Current User     Types: Chew   Substance and Sexual Activity   • Alcohol use: Yes   • Drug use: No   • Sexual activity: Not on file      Social History     Social History Narrative   • Not on file     Family History   Problem Relation Age of Onset   • Sleep apnea Mother        Review of Systems: 14 point review of systems performed, positive pertinent findings identified in HPI. All remaining systems negative     Review of Systems      Physical Exam:   Vitals:    02/27/20 0946   Temp: 98.1 °F (36.7 °C)   Weight: 116 kg (256 lb)   Height: 180.3 cm (71\")   PainSc:   7     Physical Exam   Constitutional: pleasant, well developed On exam he is with his significant other who used to work here and I believe his sister.  Eyes: sclera non icteric  Hearing : adequate for exam  Cardiovascular: palpable pulses in right foot, right calf/ thigh NT without sign of DVT  Respiratoy: breathing unlabored   Neurological: grossly sensate to LT throughout right LE  Psychiatric: oriented with normal mood and affect.   Lymphatic: No palpable popliteal lymphadenopathy right LE  Skin: intact throughout right leg/foot  Musculoskeletal: He is ambulating without assistive device.  There is mild swelling to the right ankle with discomfort over the posterior lateral aspect more so than anterior aspect of the ankle to palpation and with range of motion.  There was some discomfort in the retrocalcaneal bursal area and some mild tingling with palpation along the sural nerve but no true hyperesthesia or mottling.  Physical Exam  Ortho Exam    Radiology: 3 views of the right ankle ordered evaluate pain reviewed and compared with previous x-rays from 11/21/2019.  Talus appears to be well-seated within the mortise without appreciable lateral shift.  I do not see any clear obvious fracture about the ankle though there is some " questionable area along the posterior malleolus.    MRI films and report of the right ankle dated 12/3/2019 from [facility] reviewed which did not show any obvious abnormality of the peroneal tendons anterior tendons medial tendons syndesmosis talofibular or CFL ligaments or deep deltoid.  There was some articular marrow edema in the posterior lateral aspect of the tibial plafond with an ill-defined nondisplaced nondisplaced low T1 signal suggesting impaction type injury remainder bone marrow was within normal limits.    Assessment/Plan: 1.  Right ankle  MRI evidence of marrow edema in the posterior lateral plafond with possible nondisplaced impaction fracture of the posterior lateral tibial plafond  2.  Right ankle neuritis    Reviewed with him that on MRI do not see any obvious ligamentous or tendinous injury but could be occult.    Right now I do not see thing I would do right away from a surgical standpoint as we need to get some more information.    A think a CT scan would be a good idea given his equivocal findings on MRI and some questionable bony fragments in order to get better delineation of this and for possible surgical planning if necessary.    In the interim we will have him use compression hose to help with the swelling in his right leg and will have him start gabapentin 3 mg p.o. nightly to see if that helps with some of the neuritic pain that he is having.    Also have him continue physical therapy at [facility] including desensitization.    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

## 2020-02-28 ENCOUNTER — TREATMENT (OUTPATIENT)
Dept: PHYSICAL THERAPY | Facility: CLINIC | Age: 45
End: 2020-02-28

## 2020-02-28 DIAGNOSIS — S99.911D RIGHT ANKLE INJURY, SUBSEQUENT ENCOUNTER: Primary | ICD-10-CM

## 2020-02-28 PROCEDURE — 97112 NEUROMUSCULAR REEDUCATION: CPT | Performed by: PHYSICAL THERAPIST

## 2020-02-28 PROCEDURE — 97110 THERAPEUTIC EXERCISES: CPT | Performed by: PHYSICAL THERAPIST

## 2020-02-28 PROCEDURE — 97140 MANUAL THERAPY 1/> REGIONS: CPT | Performed by: PHYSICAL THERAPIST

## 2020-02-28 NOTE — PROGRESS NOTES
Physical Therapy Daily Progress Note      Patient: Austen Loco   : 1975  Referring practitioner: Gerald Christie MD  Date of Initial Visit: Type: THERAPY  Noted: 2020  Today's Date: 2020  Patient seen for 10 sessions             Subjective Pt went to MD yesterday and ordered a CT to see if surgery is indicated.  Pt is having continued swelling especially with the drop in temperatures.    Objective   See Exercise, Manual, and Modality Logs for complete treatment.   Wearing COURTNEY hose today.   Ice applied at end of session  Desensitization added     Assessment/Plan Pt is progressing slowly but tolerated all ex today    Visit Diagnoses:    ICD-10-CM ICD-9-CM   1. Right ankle injury, subsequent encounter S99.911D V58.89     959.7            Timed:  Manual Therapy:    13     mins  24753;  Therapeutic Exercise:    19     mins  14156;     Neuromuscular Dm:    12    mins  61054;    Therapeutic Activity:     0     mins  65815;     Gait Trainin     mins  59479;     Ultrasound:     0     mins  59807;    Electrical Stimulation:    0     mins  53347 ( );    Untimed:  Electrical Stimulation:    0     mins  48885 ( );  Mechanical Traction:    0     mins  23423;     Timed Treatment:   44   mins   Total Treatment:     44   mins  Ange Singh, PT  Physical Therapist

## 2020-03-03 ENCOUNTER — TREATMENT (OUTPATIENT)
Dept: PHYSICAL THERAPY | Facility: CLINIC | Age: 45
End: 2020-03-03

## 2020-03-03 DIAGNOSIS — S99.911D RIGHT ANKLE INJURY, SUBSEQUENT ENCOUNTER: Primary | ICD-10-CM

## 2020-03-03 PROCEDURE — 97110 THERAPEUTIC EXERCISES: CPT | Performed by: PHYSICAL THERAPIST

## 2020-03-03 PROCEDURE — 97530 THERAPEUTIC ACTIVITIES: CPT | Performed by: PHYSICAL THERAPIST

## 2020-03-03 PROCEDURE — 97112 NEUROMUSCULAR REEDUCATION: CPT | Performed by: PHYSICAL THERAPIST

## 2020-03-03 NOTE — PROGRESS NOTES
Physical Therapy Daily Progress Note      Patient: Austen Loco   : 1975  Referring practitioner: Gerald Christie MD  Date of Initial Visit: Type: THERAPY  Noted: 2020  Today's Date: 3/3/2020  Patient seen for 11 sessions               Subjective Pt states his ankle is hurting today.  He got tripped up by the dog the other day and had extreme pain but he is now able to put weight on it.     Objective   See Exercise, Manual, and Modality Logs for complete treatment.       Assessment/Plan More work today on intrinsics of the foot and single leg stance challenges  Visit Diagnoses:    ICD-10-CM ICD-9-CM   1. Right ankle injury, subsequent encounter S99.911D V58.89     959.7          Timed:  Manual Therapy:    0     mins  78654;  Therapeutic Exercise:    14     mins  52276;     Neuromuscular Dm:    17    mins  45824;    Therapeutic Activity:     14     mins  47799;     Gait Trainin     mins  48199;     Ultrasound:     0     mins  09213;    Electrical Stimulation:    0     mins  29304 ( );    Untimed:  Electrical Stimulation:    0     mins  79215 ( );  Mechanical Traction:    0     mins  58475;     Timed Treatment:   36   mins   Total Treatment:     36   mins  Ange Singh, PT  Physical Therapist

## 2020-03-05 ENCOUNTER — TREATMENT (OUTPATIENT)
Dept: PHYSICAL THERAPY | Facility: CLINIC | Age: 45
End: 2020-03-05

## 2020-03-05 DIAGNOSIS — S99.911D RIGHT ANKLE INJURY, SUBSEQUENT ENCOUNTER: Primary | ICD-10-CM

## 2020-03-05 PROCEDURE — 97112 NEUROMUSCULAR REEDUCATION: CPT | Performed by: PHYSICAL THERAPIST

## 2020-03-05 PROCEDURE — 97530 THERAPEUTIC ACTIVITIES: CPT | Performed by: PHYSICAL THERAPIST

## 2020-03-05 PROCEDURE — 97110 THERAPEUTIC EXERCISES: CPT | Performed by: PHYSICAL THERAPIST

## 2020-03-05 NOTE — PROGRESS NOTES
MD note/  Physical Therapy Daily Progress Note      Patient: Ausetn Loco   : 1975  Referring practitioner: Gerald Christie MD  Date of Initial Visit: Type: THERAPY  Noted: 2020  Today's Date: 3/5/2020  Patient seen for 12 sessions         LEFS 42/80    Subjective  Pt sees MD Monday and he wants to try his ex at home since [facility] only approves 22 visits a year and he is close to that so he feels he needs to save some.  Pain on stairs 4/10 max and 2/10 pain on average with walking    Objective   See Exercise, Manual, and Modality Logs for complete treatment.     DF 4+/5  Inv 4+/5  Ev 4-/5  PF 3+/5    PF 54 degrees  DF 98 degrees  Inv 25 degrees  Ev 16 degrees    Gait antalgic with  limited push off but equal step length and normal wt shift      Assessment/Plan ROM has improved as well as strength but he continues to have gait deficits.  LEFS and gait improved since last re-assessment as well.     Goals  Plan Goals: STG(In 4 weeks)  1) Pt is I with HEP with no increase in pain with progression to full standing dynamic exercise MET  2) Pt demonstrates improved R ankle ROM by 5 degrees in DF and PF PARTIALLY MET  3) Pt will be able to tolerate and complete single leg stance R LE with no UE support for 10 seconds indicating reduced pain and improved stability  MET  4) Pt will demonstrate improved R ankle strength to 4/5 in all planes PARTIALLY MET    LTG(In 8 weeks)  1) Pt will score 25% or higher on LEFS indicating improved perceived ability MET  2) Pt will be able to navigate stairs reciprocally with pain 2/10 or less PARTIALLY MET  3) Pt will report being able to walk 10 min with R ankle brace on on even/uneven surfaces with pain less than 2/10 NOT MET  4) Pt will demonstrate improved strength to 4+/5 in R ankle and able to complete activity such as  SLS with reach and ball toss with no pain or need for UE support NOT MET    Visit Diagnoses:    ICD-10-CM ICD-9-CM   1. Right ankle injury,  subsequent encounter S99.911D V58.89     959.7            Timed:  Manual Therapy:    0     mins  19804;  Therapeutic Exercise:    16     mins  99398;     Neuromuscular Dm:    12    mins  03236;    Therapeutic Activity:     15     mins  68964;     Gait Trainin     mins  19208;     Ultrasound:     0     mins  55122;    Electrical Stimulation:    0     mins  58556 ( );    Untimed:  Electrical Stimulation:    0     mins  22888 ( );  Mechanical Traction:    0     mins  12977;     Timed Treatment:   43   mins   Total Treatment:     43   mins  Ange Singh, PT  Physical Therapist

## 2020-03-09 ENCOUNTER — OFFICE VISIT (OUTPATIENT)
Dept: ORTHOPEDIC SURGERY | Facility: CLINIC | Age: 45
End: 2020-03-09

## 2020-03-09 VITALS — WEIGHT: 256 LBS | TEMPERATURE: 97.2 F | HEIGHT: 70 IN | BODY MASS INDEX: 36.65 KG/M2

## 2020-03-09 DIAGNOSIS — G57.91 NEURITIS OF RIGHT ANKLE: Primary | ICD-10-CM

## 2020-03-09 DIAGNOSIS — S82.871A: ICD-10-CM

## 2020-03-09 DIAGNOSIS — S82.871D: ICD-10-CM

## 2020-03-09 DIAGNOSIS — G89.29 CHRONIC PAIN OF RIGHT ANKLE: ICD-10-CM

## 2020-03-09 DIAGNOSIS — M25.571 CHRONIC PAIN OF RIGHT ANKLE: ICD-10-CM

## 2020-03-09 PROBLEM — S82.873D: Status: ACTIVE | Noted: 2020-03-09

## 2020-03-09 PROCEDURE — 99213 OFFICE O/P EST LOW 20 MIN: CPT | Performed by: ORTHOPAEDIC SURGERY

## 2020-03-10 NOTE — PROGRESS NOTES
"Ankle Follow Up      Patient: Austen Loco    YOB: 1975 44 y.o. male    Chief Complaints: Ankle pain    History of Present Illness: Patient was seen on 2/27/2020 reporting that he injured his right ankle on 11/8/2019 with a large cable with excessive pressure when across his chest pushing him backwards and twisting his ankle but he is unsure as to what mechanism his ankle went into.  He may have been up under him he is not sure he said he felt a lot of popping and pulling when it happened.    He was followed by Dr. Christie with an MRI which had shown a nondisplaced impaction fracture of the posterior lateral tibial plafond.  He was treated initially in a boot and then an ASO and use of hightop as well as physical therapy with some initial improvement that plateaued.    When I saw him previously he was continued in his ASO brace and hightop boots and sent for CT scan due to complaints of moderate to severe stabbing aching pain in the posterior lateral aspect of the right ankle with feelings of burning and tingling with increased swelling during the day with some improvement overnight with elevation.          HPI    ROS: ankle pain  Past Medical History:   Diagnosis Date   • Allergic    • Asthma        Physical Exam:   Vitals:    03/09/20 1457   Temp: 97.2 °F (36.2 °C)   Weight: 116 kg (256 lb)   Height: 177.8 cm (70\")   PainSc:   4     Well developed with normal mood.  He is with his sister again today.  Right ankle showed very slight swelling and discomfort on the posterior lateral aspect more so than anterior aspect pain was somewhat along the peroneal tendons he was able to actively gokul but also had some mild tingling and discomfort to palpation along the sural nerve with symptoms into the foot with a Tinel's but no hyperesthesia or mottling.  He had grossly stable anterior drawer.      Radiology: CT scan report was reviewed as he did not bring the films and which report a well-corticated " ossicle anterior to the anterior process the calcaneus thought to represent an accessory ossicle but no cortical fracture identified in the posterior lateral tibial plafond and no focal periostitis.      Assessment/Plan:  1.  Right ankle  MRI evidence of marrow edema in the posterior lateral plafond with possible nondisplaced impaction fracture of the posterior lateral tibial plafond  2.  Right ankle neuritis    We discussed treatment options going forward and nothing I would readily do from a surgical standpoint at this time although pain does seem to be quite a bit along his peroneal tendons in addition to the sural nerve.    We will have him discontinue his ASO brace so as to minimize pressure on the posterior lateral ankle but continue with hightop boots and will have him use a compounding cream consisting of gabapentin 5%, ketoprofen 10%, lidocaine 5% to that area.    We will allow him to return to work in 1 week with no more than 2 hours of standing per day otherwise seated with foot elevated and continue with compression hose.    We will check him back in 2 weeks to see how he is doing and is can to bring his images and with him at that time for further evaluation.   Need for prophylactic measure

## 2020-03-23 ENCOUNTER — OFFICE VISIT (OUTPATIENT)
Dept: ORTHOPEDIC SURGERY | Facility: CLINIC | Age: 45
End: 2020-03-23

## 2020-03-23 VITALS — TEMPERATURE: 98.1 F | BODY MASS INDEX: 36.36 KG/M2 | HEIGHT: 70 IN | WEIGHT: 254 LBS

## 2020-03-23 DIAGNOSIS — S82.871D: ICD-10-CM

## 2020-03-23 DIAGNOSIS — G57.91 NEURITIS OF RIGHT ANKLE: Primary | ICD-10-CM

## 2020-03-23 PROCEDURE — 99213 OFFICE O/P EST LOW 20 MIN: CPT | Performed by: ORTHOPAEDIC SURGERY

## 2020-03-23 NOTE — PROGRESS NOTES
"Ankle Follow Up      Patient: Austen Loco    YOB: 1975 44 y.o. male    Chief Complaints: Ankle feels some better    History of Present Illness:Patient was seen on 2/27/2020 reporting that he injured his right ankle on 11/8/2019 with a large cable with excessive pressure when across his chest pushing him backwards and twisting his ankle but he is unsure as to what mechanism his ankle went into.  He may have been up under him he is not sure he said he felt a lot of popping and pulling when it happened.     He was followed by Dr. Christie with an MRI which had shown a nondisplaced impaction fracture of the posterior lateral tibial plafond.  He was treated initially in a boot and then an ASO and use of hightop as well as physical therapy with some initial improvement that plateaued.    Seen on 3/9/2020 for review of his CT scan and I reviewed the report and he brought the films in for me to review today.  Time he was instructed to discontinue his ASO brace to minimize pressure on the posterior lateral ankle but is been continuing use hightop boots and some type of elastic brace.  He has been using compounding cream since our last visit reports improvement in his neuritic symptoms and lateral ankle pain.  HPI    ROS: ankle pain  Past Medical History:   Diagnosis Date   • Allergic    • Asthma        Physical Exam:   Vitals:    03/23/20 1051   Temp: 98.1 °F (36.7 °C)   TempSrc: Temporal   Weight: 115 kg (254 lb)   Height: 177.8 cm (70\")   PainSc:   3     Well developed with normal mood.  On exam he is ambulating without assistive device.  There is minimal swelling to the ankle no warmth erythema no mottling or hyperesthesia.  There was really no appreciable Tinel's over the anterior aspect of the ankle along the superficial peroneal nerve and only minimal along the sural nerve less so than at her last visit.  Is grossly sensate light touch over the inferolateral hindfoot and had slight discomfort on " the posterior lateral ankle but less so than her last visit.      Radiology:CT scan films and report were reviewed (he did not have the films at the last visit and which report a well-corticated ossicle anterior to the anterior process the calcaneus thought to represent an accessory ossicle but no cortical fracture identified in the posterior lateral tibial plafond and no focal periostitis.      MRI films and report of the right ankle dated 12/3/2019 from [facility] reviewed which did not show any obvious abnormality of the peroneal tendons anterior tendons medial tendons syndesmosis talofibular or CFL ligaments or deep deltoid.  There was some articular marrow edema in the posterior lateral aspect of the tibial plafond with an ill-defined nondisplaced nondisplaced low T1 signal suggesting impaction type injury remainder bone marrow was within normal limits.    Assessment/Plan:  1.  Right ankle  MRI evidence of marrow edema in the posterior lateral plafond with possible nondisplaced impaction fracture of the posterior lateral tibial plafond  2.  Right ankle neuritis    Reviewed treatment options with him today and nothing I would recommend from a surgical standpoint at this time especially as his symptoms are improving.  We will continue with use of compounding cream and is continuing to work and will use hightop boots and when not doing this may use an ASO brace if he has any feelings of instability but counseled him to limit this so as to limit pressure on his nerves.    Overall he seems to be improving and I will see him back in 4 weeks no x-rays

## 2020-05-08 DIAGNOSIS — G57.91 NEURITIS OF RIGHT ANKLE: ICD-10-CM

## 2020-05-08 RX ORDER — GABAPENTIN 300 MG/1
CAPSULE ORAL
Qty: 30 CAPSULE | Refills: 0 | Status: SHIPPED | OUTPATIENT
Start: 2020-05-08 | End: 2020-06-08

## 2020-06-07 DIAGNOSIS — G57.91 NEURITIS OF RIGHT ANKLE: ICD-10-CM

## 2020-06-08 RX ORDER — GABAPENTIN 300 MG/1
CAPSULE ORAL
Qty: 30 CAPSULE | Refills: 0 | Status: SHIPPED | OUTPATIENT
Start: 2020-06-08 | End: 2020-08-05

## 2020-06-08 NOTE — TELEPHONE ENCOUNTER
Please let patient know we can refill this 1 more time but he needs to schedule a follow-up appointment and would not have any further refills until he is seen in the office.

## 2020-06-29 ENCOUNTER — OFFICE VISIT (OUTPATIENT)
Dept: ORTHOPEDIC SURGERY | Facility: CLINIC | Age: 45
End: 2020-06-29

## 2020-06-29 VITALS — HEIGHT: 70 IN | TEMPERATURE: 97.6 F | WEIGHT: 254 LBS | BODY MASS INDEX: 36.36 KG/M2

## 2020-06-29 DIAGNOSIS — M25.512 LEFT SHOULDER PAIN, UNSPECIFIED CHRONICITY: Primary | ICD-10-CM

## 2020-06-29 PROCEDURE — 99214 OFFICE O/P EST MOD 30 MIN: CPT | Performed by: ORTHOPAEDIC SURGERY

## 2020-06-29 PROCEDURE — 73030 X-RAY EXAM OF SHOULDER: CPT | Performed by: ORTHOPAEDIC SURGERY

## 2020-06-29 RX ORDER — GABAPENTIN 300 MG/1
300 CAPSULE ORAL DAILY
Qty: 30 CAPSULE | Refills: 0 | Status: SHIPPED | OUTPATIENT
Start: 2020-06-29 | End: 2020-07-01 | Stop reason: SDUPTHER

## 2020-06-29 NOTE — PROGRESS NOTES
Patient: Austen Loco    YOB: 1975    Medical Record Number: [id number]    Chief Complaints:   Left shoulder pain    History of Present Illness:     45 y.o. male patient who presents with a complaint of left shoulder pain.  He reports that the symptoms first started several weeks ago.  He does not recall any injury or specific event though.  Pain is primarily on the top of the shoulder.  Difficult pain is described as severe, intermittent and stabbing.  The pain is worse when lying on this side and lifting.  He denies any alleviating factors.  He denies any shooting pain down the arm, weakness, numbness or paresthesias.  Of note, he bought a bunch of ibuprofen at [employer].  The ibuprofen does significantly help his pain.  He also takes gabapentin for his ankle and that seems to help as well.  He is out of the gabapentin and he would like me to refill that for him today.    Allergies: No Known Allergies    Medications:   Home Medications:    Current Outpatient Medications:   •  gabapentin (NEURONTIN) 300 MG capsule, TAKE ONE CAPSULE BY MOUTH EVERY NIGHT AT BEDTIME, Disp: 30 capsule, Rfl: 0  •  ibuprofen (ADVIL,MOTRIN) 200 MG tablet, Take 200 mg by mouth Every 6 (Six) Hours As Needed for Mild Pain ., Disp: , Rfl:   •  Loratadine (CLARITIN PO), Take  by mouth., Disp: , Rfl:   •  meloxicam (MOBIC) 15 MG tablet, Take 1 tablet by mouth Daily., Disp: 30 tablet, Rfl: 2  •  montelukast (SINGULAIR) 10 MG tablet, , Disp: , Rfl:   •  nitazoxanide (ALINIA) 500 MG tablet, Take 1 tablet by mouth 2 (Two) Times a Day With Meals., Disp: 6 tablet, Rfl: 0  •  omeprazole (PRILOSEC) 20 MG capsule, Take 1 capsule by mouth Daily., Disp: 90 capsule, Rfl: 3  •  SYMBICORT 160-4.5 MCG/ACT inhaler, , Disp: , Rfl:   •  VENTOLIN  (90 Base) MCG/ACT inhaler, , Disp: , Rfl:     Past Medical History:   Diagnosis Date   • Allergic    • Asthma      History reviewed. No pertinent surgical history.    Social History  "    Occupational History   • Not on file   Tobacco Use   • Smoking status: Never Smoker   • Smokeless tobacco: Current User     Types: Chew   Substance and Sexual Activity   • Alcohol use: Yes   • Drug use: No   • Sexual activity: Defer      Social History     Social History Narrative   • Not on file       Family History   Problem Relation Age of Onset   • Sleep apnea Mother        Review of Systems:      Constitutional: Denies fever, shaking or chills   Eyes: Denies change in visual acuity   HEENT: Denies nasal congestion or sore throat   Respiratory: Denies cough or shortness of breath   Cardiovascular: Denies chest pain or edema  Endocrine: Denies tremors, palpitations, intolerance of heat or cold, polyuria, polydipsia.  GI: Denies abdominal pain, nausea, vomiting, bloody stools or diarrhea  : Denies frequency, urgency, incontinence, retention, or nocturia.  Musculoskeletal: Denies numbness, tingling or loss of motor function except as above  Integument: Denies rash, lesion or ulceration   Neurologic: Denies headache or focal weakness, deficits  Heme: Denies spontaneous or excessive bleeding, epistaxis, hematuria, melena, fatigue, enlarged or tender lymph nodes.      All other pertinent positives and negatives as noted above in HPI.    Physical Exam:   45 y.o. male  Vitals:    06/29/20 1146   Temp: 97.6 °F (36.4 °C)   Weight: 115 kg (254 lb)   Height: 177.8 cm (70\")     General:  Patient is awake and alert.  Appears in no acute distress or discomfort.    Psych:  Affect and demeanor are appropriate.    Eyes:  Conjunctiva and sclera appear grossly normal.  Eyes track well and EOM seem to be intact.    Ears:  No gross abnormalities.  Hearing adequate for the exam.    Dentition:  No gross abnormalities noted.    Cardiovascular:  Regular rate and rhythm.    Lungs:  Good chest expansion.  Breathing unlabored.    Lymph:  No palpable adenopathy about neck or axilla.    Neck:  Supple.  Normal ROM.  Negative Spurling's " for shoulder or arm pain.    Left upper extremity: Skin is benign.  No gross abnormalities on inspection including any atrophy, swellings, or masses.  No palpable masses or adenopathy.  Focal tenderness noted over the acromioclavicular joint.  Full shoulder motion.  No evident instability or apprehension.  Positive active compression maneuver which reproduces the patient's typical pain.  Good strength in the rotator cuff, deltoid, biceps, triceps, and .  Intact sensation throughout the arm.  Brisk cap refill.          Radiology:  AP, scapular Y, and axillary views of the left shoulder are ordered by myself and reviewed to evaluate the patient's complaint.  No comparison films are immediately available.  The x-rays show moderate acromioclavicular arthritis.  There are no obvious acute abnormalities, lesions, masses, significant glenohumeral degenerative changes, or other concerning findings.  The acromiohumeral interval is normal.  Glenoid version appears normal as well.    Assessment/Plan:   Left acromioclavicular arthritis    We discussed treatment options in detail.  I have recommended that we start with a conservative approach.  With regards to conservative options, we discussed appropriate activity modifications, icing as needed, anti-inflammatories, physical therapy, and injections.  For now, he says the pain is not really bad enough to justify an injection.  He wants to try continuing the ibuprofen and gabapentin.  I did agree to refill the gabapentin for him.  Risks were discussed.  I told him that I do not want to keep him on this medicine long-term though.  He needs to wean off of this after 1 month.  He understands and is willing to do that.  If he changes his mind about the injection, I told him I will be happy to see him back.  For now, I will release him to follow-up with me as needed.    Reyes Mitchell MD    06/29/2020    CC to Junior Cuevas APRN

## 2020-07-01 ENCOUNTER — OFFICE VISIT (OUTPATIENT)
Dept: FAMILY MEDICINE CLINIC | Facility: CLINIC | Age: 45
End: 2020-07-01

## 2020-07-01 VITALS
DIASTOLIC BLOOD PRESSURE: 78 MMHG | RESPIRATION RATE: 18 BRPM | HEART RATE: 80 BPM | BODY MASS INDEX: 36.45 KG/M2 | OXYGEN SATURATION: 98 % | TEMPERATURE: 98.6 F | SYSTOLIC BLOOD PRESSURE: 120 MMHG | HEIGHT: 70 IN

## 2020-07-01 DIAGNOSIS — Z00.00 ROUTINE GENERAL MEDICAL EXAMINATION AT A HEALTH CARE FACILITY: Primary | ICD-10-CM

## 2020-07-01 DIAGNOSIS — Z13.6 SCREENING FOR ISCHEMIC HEART DISEASE: ICD-10-CM

## 2020-07-01 DIAGNOSIS — Z13.0 SCREENING FOR IRON DEFICIENCY ANEMIA: ICD-10-CM

## 2020-07-01 DIAGNOSIS — Z13.1 SCREENING FOR DIABETES MELLITUS: ICD-10-CM

## 2020-07-01 DIAGNOSIS — Z12.5 SPECIAL SCREENING FOR MALIGNANT NEOPLASM OF PROSTATE: ICD-10-CM

## 2020-07-01 PROCEDURE — 99396 PREV VISIT EST AGE 40-64: CPT | Performed by: NURSE PRACTITIONER

## 2020-07-01 NOTE — PROGRESS NOTES
"Subjective   Austen Loco is a 45 y.o. male.     History of Present Illness   Well Adult Physical: Patient here for a comprehensive physical exam.The patient reports no problems  Do you take any herbs or supplements that were not prescribed by a doctor? no Are you taking calcium supplements? no Are you taking aspirin daily? no      The following portions of the patient's history were reviewed and updated as appropriate: allergies, current medications, past social history and problem list.    Review of Systems   Constitutional: Negative for fever.   Respiratory: Negative for cough and shortness of breath.    Cardiovascular: Negative for chest pain.   Gastrointestinal: Negative for abdominal pain.   Neurological: Negative for dizziness.       Objective   /78   Pulse 80   Temp 98.6 °F (37 °C)   Resp 18   Ht 177.8 cm (70\")   SpO2 98%   BMI 36.45 kg/m²   Physical Exam   Constitutional: He is oriented to person, place, and time. He appears well-developed and well-nourished. No distress.   HENT:   Head: Normocephalic and atraumatic. Hair is normal.   Right Ear: Hearing, tympanic membrane, external ear and ear canal normal.   Left Ear: Hearing, tympanic membrane, external ear and ear canal normal.   Nose: No sinus tenderness or nasal deformity.   Mouth/Throat: Uvula is midline, oropharynx is clear and moist and mucous membranes are normal. No oral lesions. No uvula swelling.   Eyes: Pupils are equal, round, and reactive to light. Conjunctivae, EOM and lids are normal. Right eye exhibits no discharge. Left eye exhibits no discharge. No scleral icterus. Right eye exhibits normal extraocular motion and no nystagmus. Left eye exhibits normal extraocular motion and no nystagmus.   Neck: Normal range of motion. Neck supple. No JVD present. No thyromegaly present.   Cardiovascular: Normal rate, regular rhythm, normal heart sounds, intact distal pulses and normal pulses. Exam reveals no gallop.   No murmur " heard.  Pulmonary/Chest: Effort normal and breath sounds normal. No respiratory distress. He has no wheezes. He has no rales. He exhibits no tenderness.   Abdominal: Soft. Bowel sounds are normal. He exhibits no distension and no mass. There is no tenderness. There is no guarding. No hernia.   Musculoskeletal: Normal range of motion. He exhibits no edema, tenderness or deformity.   Lymphadenopathy:     He has no cervical adenopathy.   Neurological: He is alert and oriented to person, place, and time. He has normal reflexes. He displays normal reflexes. No cranial nerve deficit. He exhibits normal muscle tone. Coordination normal.   Skin: Skin is warm and dry. No rash noted. He is not diaphoretic.   Psychiatric: He has a normal mood and affect. His behavior is normal. Judgment and thought content normal.   Vitals reviewed.      Assessment/Plan      Diagnosis Plan   1. Routine general medical examination at a health care facility     2. Screening for iron deficiency anemia  CBC & Differential   3. Screening for diabetes mellitus  Comprehensive Metabolic Panel   4. Screening for ischemic heart disease  Lipid Panel With LDL / HDL Ratio   5. Special screening for malignant neoplasm of prostate  PSA Screen     Discussed weight, diet and exercise  Follow up after labs      REKHA Ragsdale  7/1/2020

## 2020-07-08 ENCOUNTER — OFFICE VISIT (OUTPATIENT)
Dept: ORTHOPEDIC SURGERY | Facility: CLINIC | Age: 45
End: 2020-07-08

## 2020-07-08 VITALS — BODY MASS INDEX: 36.08 KG/M2 | WEIGHT: 252 LBS | HEIGHT: 70 IN | TEMPERATURE: 97.5 F

## 2020-07-08 DIAGNOSIS — S82.871D: ICD-10-CM

## 2020-07-08 DIAGNOSIS — G57.91 NEURITIS OF RIGHT ANKLE: Primary | ICD-10-CM

## 2020-07-08 PROCEDURE — 99213 OFFICE O/P EST LOW 20 MIN: CPT | Performed by: ORTHOPAEDIC SURGERY

## 2020-07-08 NOTE — PROGRESS NOTES
"Ankle Follow Up      Patient: Austen Loco    YOB: 1975 45 y.o. male    Chief Complaints: Ankle hurts some but getting better    History of Present Illness:Patient was seen on 2/27/2020 reporting that he injured his right ankle on 11/8/2019 with a large cable with excessive pressure when across his chest pushing him backwards and twisting his ankle but he is unsure as to what mechanism his ankle went into.  He may have been up under him he is not sure he said he felt a lot of popping and pulling when it happened.     He was followed by Dr. Christie with an MRI which had shown a nondisplaced impaction fracture of the posterior lateral tibial plafond.  He was treated initially in a boot and then an ASO and use of hightop as well as physical therapy with some initial improvement that plateaued.     Seen on 3/9/2020 for review of his CT scan and I reviewed the report and he brought the films in for me to review today.    At that time he was instructed to discontinue his ASO brace to minimize pressure on the posterior lateral ankle but is been continuing use hightop boots and some type of elastic brace.     He was last seen on 3/23/2020 and been using compounding cream and has been using gabapentin.    States that the pain is still much better than it was and is back to working his regular work including 10,000 steps or more per day.  Still gets some slight achiness in the anterolateral aspect of the right ankle towards the end of the day and still gets an intermittent feeling of pins-and-needles in the lateral aspect of the ankle but this has improved as well.  HPI    ROS: ankle pain  Past Medical History:   Diagnosis Date   • Allergic    • Asthma        Physical Exam:   Vitals:    07/08/20 0959   Temp: 97.5 °F (36.4 °C)   TempSrc: Temporal   Weight: 114 kg (252 lb)   Height: 177.8 cm (70\")   PainSc:   2     Well developed with normal mood.  On exam he has a nonantalgic gait.  There was very " slight discomfort along the anterolateral ankle but no instability on anterior drawer and no focal pain along the peroneal tendons.  There was no pain with range of motion.  I was unable to elicit any Tinel's over the superficial peroneal nerve and minimal if any along the sural nerve but certainly no mottling or hyperesthesia.      Radiology: None performed      Assessment/Plan:  1.  Right ankle  MRI evidence of marrow edema in the posterior lateral plafond with possible nondisplaced impaction fracture of the posterior lateral tibial plafond  2.  Right ankle neuritis    Overall he seems to be improving and nothing I could recommend from a surgical standpoint for him at this time.    I have him start weaning off of the gabapentin and continue with use of compounding cream and he declined any formal therapy but is done some therapy in the past and recommend he resume home exercises for this.    We had a pleasant visit and I will see him back as needed

## 2020-08-05 DIAGNOSIS — G57.91 NEURITIS OF RIGHT ANKLE: ICD-10-CM

## 2020-08-05 RX ORDER — GABAPENTIN 300 MG/1
CAPSULE ORAL
Qty: 30 CAPSULE | Refills: 0 | Status: SHIPPED | OUTPATIENT
Start: 2020-08-05

## 2020-08-05 NOTE — TELEPHONE ENCOUNTER
Make sure he understands that this is the last prescription that I can give him for the gabapentin.

## 2021-04-08 ENCOUNTER — IMMUNIZATION (OUTPATIENT)
Dept: VACCINE CLINIC | Facility: HOSPITAL | Age: 46
End: 2021-04-08

## 2021-04-08 PROCEDURE — 91300 HC SARSCOV02 VAC 30MCG/0.3ML IM: CPT | Performed by: INTERNAL MEDICINE

## 2021-04-08 PROCEDURE — 0001A: CPT | Performed by: INTERNAL MEDICINE

## 2021-04-29 ENCOUNTER — APPOINTMENT (OUTPATIENT)
Dept: VACCINE CLINIC | Facility: HOSPITAL | Age: 46
End: 2021-04-29

## 2021-04-30 ENCOUNTER — IMMUNIZATION (OUTPATIENT)
Dept: VACCINE CLINIC | Facility: HOSPITAL | Age: 46
End: 2021-04-30

## 2021-04-30 PROCEDURE — 0002A: CPT | Performed by: INTERNAL MEDICINE

## 2021-04-30 PROCEDURE — 91300 HC SARSCOV02 VAC 30MCG/0.3ML IM: CPT | Performed by: INTERNAL MEDICINE

## 2021-09-20 ENCOUNTER — OFFICE VISIT (OUTPATIENT)
Dept: FAMILY MEDICINE CLINIC | Facility: CLINIC | Age: 46
End: 2021-09-20

## 2021-09-20 VITALS
TEMPERATURE: 97.3 F | OXYGEN SATURATION: 98 % | HEIGHT: 71 IN | WEIGHT: 258.6 LBS | HEART RATE: 70 BPM | DIASTOLIC BLOOD PRESSURE: 78 MMHG | BODY MASS INDEX: 36.2 KG/M2 | SYSTOLIC BLOOD PRESSURE: 132 MMHG

## 2021-09-20 DIAGNOSIS — Z13.6 SCREENING FOR ISCHEMIC HEART DISEASE: ICD-10-CM

## 2021-09-20 DIAGNOSIS — Z13.1 SCREENING FOR DIABETES MELLITUS: ICD-10-CM

## 2021-09-20 DIAGNOSIS — Z13.0 SCREENING FOR IRON DEFICIENCY ANEMIA: ICD-10-CM

## 2021-09-20 DIAGNOSIS — Z12.11 COLON CANCER SCREENING: ICD-10-CM

## 2021-09-20 DIAGNOSIS — Z00.00 ROUTINE GENERAL MEDICAL EXAMINATION AT A HEALTH CARE FACILITY: Primary | ICD-10-CM

## 2021-09-20 DIAGNOSIS — Z12.5 SPECIAL SCREENING FOR MALIGNANT NEOPLASM OF PROSTATE: ICD-10-CM

## 2021-09-20 PROCEDURE — 99396 PREV VISIT EST AGE 40-64: CPT | Performed by: NURSE PRACTITIONER

## 2021-09-20 NOTE — PROGRESS NOTES
"Chief Complaint  Annual Exam (Patient had 1 hard boil egg at 10am ( wore mask and goggles) )    Subjective          Austen Loco presents to [facility]  History of Present Illness  Well Adult Physical: Patient here for a comprehensive physical exam.The patient reports no problems  Do you take any herbs or supplements that were not prescribed by a doctor? no Are you taking calcium supplements? no Are you taking aspirin daily? no      Objective   Vital Signs:   /78   Pulse 70   Temp 97.3 °F (36.3 °C)   Ht 180.3 cm (71\")   Wt 117 kg (258 lb 9.6 oz)   SpO2 98%   BMI 36.07 kg/m²     Physical Exam  Vitals reviewed.   Constitutional:       General: He is not in acute distress.     Appearance: He is well-developed. He is not diaphoretic.   HENT:      Head: Normocephalic and atraumatic. Hair is normal.      Right Ear: Hearing, tympanic membrane, ear canal and external ear normal.      Left Ear: Hearing, tympanic membrane, ear canal and external ear normal.      Nose: No nasal deformity.      Mouth/Throat:      Mouth: No oral lesions.      Pharynx: Uvula midline. No uvula swelling.   Eyes:      General: Lids are normal. No scleral icterus.        Right eye: No discharge.         Left eye: No discharge.      Extraocular Movements:      Right eye: Normal extraocular motion and no nystagmus.      Left eye: Normal extraocular motion and no nystagmus.      Conjunctiva/sclera: Conjunctivae normal.      Pupils: Pupils are equal, round, and reactive to light.   Neck:      Thyroid: No thyromegaly.      Vascular: No JVD.   Cardiovascular:      Rate and Rhythm: Normal rate and regular rhythm.      Pulses: Normal pulses.      Heart sounds: Normal heart sounds. No murmur heard.   No gallop.    Pulmonary:      Effort: Pulmonary effort is normal. No respiratory distress.      Breath sounds: Normal breath sounds. No wheezing or rales.   Chest:      Chest wall: No tenderness.   Abdominal:    "   General: Bowel sounds are normal. There is no distension.      Palpations: Abdomen is soft. There is no mass.      Tenderness: There is no abdominal tenderness. There is no guarding.      Hernia: No hernia is present.   Musculoskeletal:         General: No tenderness or deformity. Normal range of motion.      Cervical back: Normal range of motion and neck supple.   Lymphadenopathy:      Cervical: No cervical adenopathy.   Skin:     General: Skin is warm and dry.      Findings: No rash.   Neurological:      Mental Status: He is alert and oriented to person, place, and time.      Cranial Nerves: No cranial nerve deficit.      Motor: No abnormal muscle tone.      Coordination: Coordination normal.      Deep Tendon Reflexes: Reflexes are normal and symmetric. Reflexes normal.   Psychiatric:         Behavior: Behavior normal.         Thought Content: Thought content normal.         Judgment: Judgment normal.        Result Review :   The following data was reviewed by: REKHA Ragsdale on 09/20/2021:                          Assessment and Plan    Diagnoses and all orders for this visit:    1. Routine general medical examination at a health care facility (Primary)    2. Screening for iron deficiency anemia  -     CBC & Differential    3. Screening for diabetes mellitus  -     Comprehensive Metabolic Panel    4. Screening for ischemic heart disease  -     Lipid Panel With LDL / HDL Ratio    5. Special screening for malignant neoplasm of prostate  -     PSA Screen    6. Colon cancer screening  -     Cologuard - Stool, Per Rectum; Future        Follow Up   Return if symptoms worsen or fail to improve.  Patient was given instructions and counseling regarding his condition or for health maintenance advice. Please see specific information pulled into the AVS if appropriate.     Discussed weight, diet and exercise  Follow up after labs    Mask and googles worn

## 2021-09-21 LAB
ALBUMIN SERPL-MCNC: 4.6 G/DL (ref 3.5–5.2)
ALBUMIN/GLOB SERPL: 2 G/DL
ALP SERPL-CCNC: 73 U/L (ref 39–117)
ALT SERPL-CCNC: 33 U/L (ref 1–41)
AST SERPL-CCNC: 26 U/L (ref 1–40)
BASOPHILS # BLD AUTO: 0.04 10*3/MM3 (ref 0–0.2)
BASOPHILS NFR BLD AUTO: 0.4 % (ref 0–1.5)
BILIRUB SERPL-MCNC: 0.4 MG/DL (ref 0–1.2)
BUN SERPL-MCNC: 12 MG/DL (ref 6–20)
BUN/CREAT SERPL: 16.4 (ref 7–25)
CALCIUM SERPL-MCNC: 9.9 MG/DL (ref 8.6–10.5)
CHLORIDE SERPL-SCNC: 101 MMOL/L (ref 98–107)
CHOLEST SERPL-MCNC: 178 MG/DL (ref 0–200)
CO2 SERPL-SCNC: 23.3 MMOL/L (ref 22–29)
CREAT SERPL-MCNC: 0.73 MG/DL (ref 0.76–1.27)
EOSINOPHIL # BLD AUTO: 0.04 10*3/MM3 (ref 0–0.4)
EOSINOPHIL NFR BLD AUTO: 0.4 % (ref 0.3–6.2)
ERYTHROCYTE [DISTWIDTH] IN BLOOD BY AUTOMATED COUNT: 12.5 % (ref 12.3–15.4)
GLOBULIN SER CALC-MCNC: 2.3 GM/DL
GLUCOSE SERPL-MCNC: 108 MG/DL (ref 65–99)
HCT VFR BLD AUTO: 46.1 % (ref 37.5–51)
HDLC SERPL-MCNC: 53 MG/DL (ref 40–60)
HGB BLD-MCNC: 15.5 G/DL (ref 13–17.7)
IMM GRANULOCYTES # BLD AUTO: 0.04 10*3/MM3 (ref 0–0.05)
IMM GRANULOCYTES NFR BLD AUTO: 0.4 % (ref 0–0.5)
LDLC SERPL CALC-MCNC: 87 MG/DL (ref 0–100)
LDLC/HDLC SERPL: 1.49 {RATIO}
LYMPHOCYTES # BLD AUTO: 1.91 10*3/MM3 (ref 0.7–3.1)
LYMPHOCYTES NFR BLD AUTO: 21.4 % (ref 19.6–45.3)
MCH RBC QN AUTO: 31.5 PG (ref 26.6–33)
MCHC RBC AUTO-ENTMCNC: 33.6 G/DL (ref 31.5–35.7)
MCV RBC AUTO: 93.7 FL (ref 79–97)
MONOCYTES # BLD AUTO: 0.9 10*3/MM3 (ref 0.1–0.9)
MONOCYTES NFR BLD AUTO: 10.1 % (ref 5–12)
NEUTROPHILS # BLD AUTO: 5.98 10*3/MM3 (ref 1.7–7)
NEUTROPHILS NFR BLD AUTO: 67.3 % (ref 42.7–76)
NRBC BLD AUTO-RTO: 0 /100 WBC (ref 0–0.2)
PLATELET # BLD AUTO: 167 10*3/MM3 (ref 140–450)
POTASSIUM SERPL-SCNC: 4.2 MMOL/L (ref 3.5–5.2)
PROT SERPL-MCNC: 6.9 G/DL (ref 6–8.5)
PSA SERPL-MCNC: 1.33 NG/ML (ref 0–4)
RBC # BLD AUTO: 4.92 10*6/MM3 (ref 4.14–5.8)
SODIUM SERPL-SCNC: 137 MMOL/L (ref 136–145)
TRIGL SERPL-MCNC: 230 MG/DL (ref 0–150)
VLDLC SERPL CALC-MCNC: 38 MG/DL (ref 5–40)
WBC # BLD AUTO: 8.91 10*3/MM3 (ref 3.4–10.8)